# Patient Record
Sex: FEMALE | Race: WHITE
[De-identification: names, ages, dates, MRNs, and addresses within clinical notes are randomized per-mention and may not be internally consistent; named-entity substitution may affect disease eponyms.]

---

## 2017-01-11 ENCOUNTER — HOSPITAL ENCOUNTER (OUTPATIENT)
Dept: HOSPITAL 75 - REHAB | Age: 52
LOS: 22 days | Discharge: HOME | End: 2017-02-02
Attending: NURSE PRACTITIONER
Payer: COMMERCIAL

## 2017-01-11 DIAGNOSIS — M54.41: Primary | ICD-10-CM

## 2017-01-20 ENCOUNTER — HOSPITAL ENCOUNTER (OUTPATIENT)
Dept: HOSPITAL 75 - CARD | Age: 52
Discharge: HOME | End: 2017-01-20
Attending: PAIN MEDICINE
Payer: COMMERCIAL

## 2017-01-20 VITALS — DIASTOLIC BLOOD PRESSURE: 107 MMHG | SYSTOLIC BLOOD PRESSURE: 121 MMHG

## 2017-01-20 VITALS — HEIGHT: 63 IN | BODY MASS INDEX: 24.8 KG/M2 | WEIGHT: 140 LBS

## 2017-01-20 VITALS — SYSTOLIC BLOOD PRESSURE: 114 MMHG | DIASTOLIC BLOOD PRESSURE: 80 MMHG

## 2017-01-20 DIAGNOSIS — M43.16: ICD-10-CM

## 2017-01-20 DIAGNOSIS — M51.16: Primary | ICD-10-CM

## 2017-01-20 DIAGNOSIS — Z79.899: ICD-10-CM

## 2017-01-20 PROCEDURE — 62323 NJX INTERLAMINAR LMBR/SAC: CPT

## 2017-01-20 NOTE — PAIN MEDICINE-PROCEDURE
Procedure


Pre-Op/Post-Op Diagnosis


Diagnosis:  disc disorder with radiculopathy, lumbar


Indications for Operation


Low back pain


Attending Surgeon


Ashish





Procedure


Date of Service:  Jan 20, 2017


Procedure: Lumbar Epidural Steroid Injection at the L5-S1 level under 

Fluoroscopic Guidance





Procedure:


Patient was identified in the holding area. After risks, benefits, and 

alternatives were discussed with the patient, informed consent was obtained. 

Patient was brought to the fluoroscopy suite and placed prone on the procedure 

room table. A time out was performed.    Vital signs were monitored throughout 

the procedure. The patients low back was prepped and draped in the usual 

sterile fashion. The patients skin was anesthetized using 2% Lidocaine. A 

Tuohy needle was inserted and advanced to the L5-S1 epidural space under 

fluoroscopic guidance using the loss of resistance technique and intermittent 

projection of fluoroscopy. There was no paresthesia with needle placement.





The needle position was confirmed in both the AP and lateral view.





After negative aspiration 2ml of   contrast was injected under live fluoroscopy 

which showed good spread of the contrast in the epidural space at the 

appropriate level, there was no intravascular or subarachnoid spread. 





Again, after negative aspiration for heme or CSF, 2 ml of 0.25% Bupivicaine, 

2ml of preservative free normal saline, and 80mg of Kenalog was injected. The 

needle was removed and a sterile bandage was placed and the patient was 

transferred to the recovery area in stable condition. 





After a brief period of observation, patient was discharged to home with no new 

neurological deficits and no apparent complications.


Complications


None








AIDAN IRWIN MD Jan 20, 2017 2:08 pm

## 2017-03-17 ENCOUNTER — HOSPITAL ENCOUNTER (OUTPATIENT)
Dept: HOSPITAL 75 - CARD | Age: 52
Discharge: HOME | End: 2017-03-17
Attending: PAIN MEDICINE
Payer: COMMERCIAL

## 2017-03-17 VITALS — SYSTOLIC BLOOD PRESSURE: 133 MMHG | DIASTOLIC BLOOD PRESSURE: 83 MMHG

## 2017-03-17 VITALS — BODY MASS INDEX: 24.8 KG/M2 | WEIGHT: 140 LBS | HEIGHT: 63 IN

## 2017-03-17 VITALS — DIASTOLIC BLOOD PRESSURE: 78 MMHG | SYSTOLIC BLOOD PRESSURE: 133 MMHG

## 2017-03-17 DIAGNOSIS — M43.16: ICD-10-CM

## 2017-03-17 DIAGNOSIS — Z79.899: ICD-10-CM

## 2017-03-17 DIAGNOSIS — M51.16: Primary | ICD-10-CM

## 2017-03-17 PROCEDURE — 62323 NJX INTERLAMINAR LMBR/SAC: CPT

## 2017-03-17 NOTE — PAIN MEDICINE-PROCEDURE
Procedure


Pre-Op/Post-Op Diagnosis


Diagnosis:  disc disorder with radiculopathy, lumbar


Indications for Operation


Low back pain


Attending Surgeon


Ashish





Procedure


Date of Service:  Mar 17, 2017


Procedure: Lumbar Epidural Steroid Injection at the L5-S1 level under 

Fluoroscopic Guidance





Procedure:


Patient was identified in the holding area. After risks, benefits, and 

alternatives were discussed with the patient, informed consent was obtained. 

Patient was brought to the fluoroscopy suite and placed prone on the procedure 

room table. A time out was performed.    Vital signs were monitored throughout 

the procedure. The patients low back was prepped and draped in the usual 

sterile fashion. The patients skin was anesthetized using 2% Lidocaine. A 

Tuohy needle was inserted and advanced to the L5-S1 epidural space under 

fluoroscopic guidance using the loss of resistance technique and intermittent 

projection of fluoroscopy. There was no paresthesia with needle placement.





The needle position was confirmed in both the AP and lateral view.





After negative aspiration 2ml of   contrast was injected under live fluoroscopy 

which showed good spread of the contrast in the epidural space at the 

appropriate level, there was no intravascular or subarachnoid spread. 





Again, after negative aspiration for heme or CSF, 2 ml of 0.25% Bupivicaine, 

2ml of preservative free normal saline, and 80mg of Kenalog was injected. The 

needle was removed and a sterile bandage was placed and the patient was 

transferred to the recovery area in stable condition. 





After a brief period of observation, patient was discharged to home with no new 

neurological deficits and no apparent complications.


Complications


None








AIDAN IRWIN MD Mar 17, 2017 11:57 am

## 2017-03-17 NOTE — XMS REPORT
Continuity of Care Document

 Created on: 2017



Ruby Rosado

External Reference #: [id number]

: 1965

Sex: Female



Demographics







 Address  NO ADDRESS

[place]  [address]

 

 Home Phone  [phone]

 

 Preferred Language  English

 

 Marital Status  Unknown

 

 Sabianist Affiliation  Unknown

 

 Race  Unknown

 

 Ethnic Group  Unknown





Author







 Author  [facility]

 

 Organization  [facility]

 

 Address  Unknown

 

 Phone  Unavailable







Care Team Providers







 Care Team Member Name  Role  Phone

 

  PCP  Unavailable







Source Comments

Some departments are not documenting in the electronic medical record.  If you 
do not see the information that you expected, contact Release of Information in 
the Health Information Management department at [phone] for further 
assistance in locating additional records.[facility]



Active Allergies and Adverse Reactions

Not on File



Current Medications

Not on file



Active Problems

Not on file



Social History







    



  Tobacco Use   Types   Packs/Day   Years Used   Date

 

    



  Never Assessed    







Plan of Care







    



  Date   Type   Specialty   Providers   Description

 

    



  2017   Appointment   Orthopedic Surgery   SAMMY Nayak MD 



     [address] [facility] 



     [address] 



     [place] [address] 



     [phone] 



     [phone] (Fax) 









   



  Health Maintenance   Due Date   Last Done   Comments

 

   



  Physical (Comprehensive)   1974  



  Exam   

 

   



  Pertussis Vaccine   1978  

 

   



  Tetanus Vaccine   1984  

 

   



  Cervical Cancer Screening   1988  

 

   



  Breast Cancer Screening   2007  

 

   



  Influenza Vaccine   2017  







Results from Last 3 Months

Not on file

## 2017-10-13 ENCOUNTER — HOSPITAL ENCOUNTER (OUTPATIENT)
Dept: HOSPITAL 75 - RAD | Age: 52
End: 2017-10-13
Attending: NURSE PRACTITIONER
Payer: COMMERCIAL

## 2017-10-13 DIAGNOSIS — F07.81: Primary | ICD-10-CM

## 2017-10-13 PROCEDURE — 70450 CT HEAD/BRAIN W/O DYE: CPT

## 2017-10-13 NOTE — DIAGNOSTIC IMAGING REPORT
INDICATION: Previous trauma to head. Now with pain. Vomiting.

Concussive syndrome.



TECHNIQUE: Routine non contrast-enhanced axial images were

obtained from the skull base to the vertex.



COMPARISON: 8/8/2013



FINDINGS: The ventricles and cortical sulci are stable in size

and contour. There is no midline shift or mass-effect. No acute

intra-axial hemorrhage is seen. There are no abnormal areas of

increased or decreased density to suggest acute hemorrhage or

edema. No extra-axial masses or collections are present. The bony

calvarium is intact. The visualized paranasal sinuses are

unremarkable. The mastoid air cells are clear.



IMPRESSION:  

1. No acute intracranial abnormality. No CT evidence of mass,

acute infarct or intracranial hemorrhage.



Dictated by: 



  Dictated on workstation # [id number]

## 2017-10-18 ENCOUNTER — HOSPITAL ENCOUNTER (EMERGENCY)
Dept: HOSPITAL 75 - ER | Age: 52
Discharge: HOME | End: 2017-10-18
Payer: SELF-PAY

## 2017-10-18 VITALS — WEIGHT: 167 LBS | BODY MASS INDEX: 29.59 KG/M2 | HEIGHT: 63 IN

## 2017-10-18 VITALS — DIASTOLIC BLOOD PRESSURE: 84 MMHG | SYSTOLIC BLOOD PRESSURE: 135 MMHG

## 2017-10-18 DIAGNOSIS — N39.0: Primary | ICD-10-CM

## 2017-10-18 DIAGNOSIS — F17.210: ICD-10-CM

## 2017-10-18 LAB
ALBUMIN SERPL-MCNC: 4.5 GM/DL (ref 3.2–4.5)
ALT SERPL-CCNC: 13 U/L (ref 0–55)
ANION GAP SERPL CALC-SCNC: 12 MMOL/L (ref 5–14)
AST SERPL-CCNC: 19 U/L (ref 5–34)
BASOPHILS # BLD AUTO: 0 10^3/UL (ref 0–0.1)
BASOPHILS NFR BLD AUTO: 0 % (ref 0–10)
BILIRUB SERPL-MCNC: 0.5 MG/DL (ref 0.1–1)
BILIRUB UR QL STRIP: NEGATIVE
BUN SERPL-MCNC: 12 MG/DL (ref 7–18)
BUN/CREAT SERPL: 15
CALCIUM SERPL-MCNC: 10 MG/DL (ref 8.5–10.1)
CHLORIDE SERPL-SCNC: 105 MMOL/L (ref 98–107)
CO2 SERPL-SCNC: 24 MMOL/L (ref 21–32)
CREAT SERPL-MCNC: 0.79 MG/DL (ref 0.6–1.3)
EOSINOPHIL # BLD AUTO: 0 10^3/UL (ref 0–0.3)
EOSINOPHIL NFR BLD AUTO: 0 % (ref 0–10)
ERYTHROCYTE [DISTWIDTH] IN BLOOD BY AUTOMATED COUNT: 13.4 % (ref 10–14.5)
GFR SERPLBLD BASED ON 1.73 SQ M-ARVRAT: > 60 ML/MIN
GLUCOSE SERPL-MCNC: 143 MG/DL (ref 70–105)
KETONES UR QL STRIP: NEGATIVE
LEUKOCYTE ESTERASE UR QL STRIP: (no result)
LIPASE SERPL-CCNC: 22 U/L (ref 8–78)
LYMPHOCYTES # BLD AUTO: 1.1 X 10^3 (ref 1–4)
LYMPHOCYTES NFR BLD AUTO: 12 % (ref 12–44)
MAGNESIUM SERPL-MCNC: 2.5 MG/DL (ref 1.8–2.4)
MCH RBC QN AUTO: 30 PG (ref 25–34)
MCHC RBC AUTO-ENTMCNC: 33 G/DL (ref 32–36)
MCV RBC AUTO: 91 FL (ref 80–99)
MONOCYTES # BLD AUTO: 0.2 X 10^3 (ref 0–1)
MONOCYTES NFR BLD AUTO: 2 % (ref 0–12)
NEUTROPHILS # BLD AUTO: 7.8 X 10^3 (ref 1.8–7.8)
NEUTROPHILS NFR BLD AUTO: 86 % (ref 42–75)
NITRITE UR QL STRIP: NEGATIVE
PH UR STRIP: 7 [PH] (ref 5–9)
PLATELET # BLD: 357 10^3/UL (ref 130–400)
PMV BLD AUTO: 9.7 FL (ref 7.4–10.4)
POTASSIUM SERPL-SCNC: 4.3 MMOL/L (ref 3.6–5)
PROT SERPL-MCNC: 8.5 GM/DL (ref 6.4–8.2)
PROT UR QL STRIP: NEGATIVE
RBC # BLD AUTO: 4.31 10^6/UL (ref 4.35–5.85)
SODIUM SERPL-SCNC: 141 MMOL/L (ref 135–145)
SP GR UR STRIP: 1 (ref 1.02–1.02)
UROBILINOGEN UR-MCNC: NORMAL MG/DL
WBC # BLD AUTO: 9 10^3/UL (ref 4.3–11)
WBC #/AREA URNS HPF: (no result) /HPF

## 2017-10-18 PROCEDURE — 36415 COLL VENOUS BLD VENIPUNCTURE: CPT

## 2017-10-18 PROCEDURE — 87088 URINE BACTERIA CULTURE: CPT

## 2017-10-18 PROCEDURE — 83690 ASSAY OF LIPASE: CPT

## 2017-10-18 PROCEDURE — 96361 HYDRATE IV INFUSION ADD-ON: CPT

## 2017-10-18 PROCEDURE — 83735 ASSAY OF MAGNESIUM: CPT

## 2017-10-18 PROCEDURE — 96374 THER/PROPH/DIAG INJ IV PUSH: CPT

## 2017-10-18 PROCEDURE — 81000 URINALYSIS NONAUTO W/SCOPE: CPT

## 2017-10-18 PROCEDURE — 96375 TX/PRO/DX INJ NEW DRUG ADDON: CPT

## 2017-10-18 PROCEDURE — 80053 COMPREHEN METABOLIC PANEL: CPT

## 2017-10-18 PROCEDURE — 85025 COMPLETE CBC W/AUTO DIFF WBC: CPT

## 2017-10-18 NOTE — XMS REPORT
[facility]

 Created on: 10/18/2016



Ashlee  Ruby

External Reference #: [id number]

: 1965

Sex: Female



Demographics







 Address  [address] [place]  [address]

 

 Home Phone  [phone]

 

 Preferred Language  Unknown

 

 Marital Status  Unknown

 

 Yazdanism Affiliation  Unknown

 

 Race  White

 

 Ethnic Group  Not  or 





Author







 Author  NAHID FONTENOT

 

 Organization  [employer]

 

 Address  Unknown

 

 Phone  Unavailable







Care Team Providers







 Care Team Member Name  Role  Phone

 

 NAHID FONTENOT  CP  Unavailable



                                                                



Allergies, Adverse Reactions, Alerts

          





 Substance  Reaction  Event Type

 

 N.K.D.A.  Info Not Available  Non Drug Allergy



                                                                               
         



Problems

          





 Problem Type  Condition  Code  Onset Dates  Condition Status

 

 Problem  Lumbago with sciatica, right side  M54.41     Active

 

 Problem  Other chronic pain  G89.29     Active

 

 Problem  Lumbago with sciatica, left side  M54.42     Active

 

 Assessment  Other chronic pain  G89.29     Active

 

 Assessment  Lumbago with sciatica, left side  M54.42     Active

 

 Assessment  Lumbago with sciatica, right side  M54.41     Active



                                                                               
                                                           



Medications

          No Known Medications                                                 
                                       



Procedures

          





 Procedure  Coding System  Code  Date

 

 Office Visit, Est Pt., Level 3  CPT-4  62573  Oct 07, 2016

 

 TORADOL (IM) 60 MG/2ML (UP TO 15 MG)  CPT-4    Oct 07, 2016

 

 X-RAY EXAM OF LOWER SPINE  CPT-4  39570  Oct 07, 2016

 

 DEPO MEDROL 40 MG/ML  CPT-4    Oct 07, 2016

 

 THER/PROPH/DIAG INJ, SC/IM  CPT-4  01407  Oct 07, 2016



                                                                               
                                                           



Vital Signs

          





 Date/Time:  Oct 07, 2016

 

 Cardiac Monitoring Heart Rate  90 bpm

 

 Weight  145 lbs

 

 Height  64 in

 

 BMI  24.89 Index

 

 Blood Pressure Diastolic  80 mmHg

 

 Blood Pressure Systolic  122 mmHg



                                                                              



Results

          No Known Results                                                     
               



Summary Purpose

          [employer] Submission

## 2017-10-18 NOTE — XMS REPORT
[facility]

 Created on: 09/10/2017



Ruby Rosado

External Reference #: [id number]

: 1965

Sex: Female



Demographics







 Address  [address] [place]  [address]

 

 Preferred Language  Unknown

 

 Marital Status  Unknown

 

 Faith Affiliation  Unknown

 

 Race  Unknown

 

 Ethnic Group  Unknown





Author







 Author  BRIELLE SERRATO

 

 [facility]

 

 Address  [address] [place]  [address]



 

 Phone  [phone]







Care Team Providers







 Care Team Member Name  Role  Phone

 

 BRIELLE SERRATO  Unavailable  [phone]







PROBLEMS







 Type  Condition  ICD9-CM Code  ZMT70-CY Code  Onset Dates  Condition Status  
SNOMED Code

 

 Problem  Lumbago with sciatica, left side     M54.42     Active  788760713

 

 Problem  Pain in right knee     M25.561     Active  [id number]

 

 Problem  Pain in left knee     M25.562     Active  27713634

 

 Problem  Other chronic pain     G89.29     Active  77501434

 

 Problem  Lumbago with sciatica, right side     M54.41     Active  [id number]

 

 Problem  Lumbago with sciatica, unspecified side     M54.40     Active  
[id number]

 

 Problem  Back pain of lumbar region with sciatica     M54.40     Active  
[id number]







ALLERGIES

Unknown Allergies



SOCIAL HISTORY

No smoking Hx information available



PLAN OF CARE





VITAL SIGNS





MEDICATIONS

Unknown Medications



RESULTS

No Results



PROCEDURES

No Known procedures



IMMUNIZATIONS

No Known Immunizations

## 2017-10-18 NOTE — XMS REPORT
Continuity of Care Document

 Created on: 10/18/2017



MYLES REYES

External Reference #: [id number]

: 1965

Sex: Female



Demographics







 Address  [address] [place] [address]

 

 Home Phone  [phone] x

 

 Preferred Language  Unknown

 

 Marital Status  Unknown

 

 Judaism Affiliation  Unknown

 

 Race  Unknown

 

 Ethnic Group  Unknown





Author







 Author  [facility]

 

 Address  Unknown

 

 Phone  Unavailable



                                                      



Allergies

                      





 Active                    Description                    Code                  
  Type                    Severity                    Reaction                  
  Onset                    Reported/Identified                    Relationship 
to Patient                    Clinical Status                

 

 Yes                    Methotrexate                                         
Drug Allergy                                                                   
                05/10/2011                                                     
     

 

 Yes                    Methotrexate                                         
Drug Allergy                    N/A                    N/A                     
                    05/10/2011                                                 
         

 

 Yes                    No Allergy Information Available                    
[id number]                    Drug Allergy                    Unknown          
          N/A                                         2012               
                                           



                                                                               
                             



Medications

                                                                               
         



Problems

                      





 Date Dx Coded                    Attending                    Type            
        Code                    Diagnosis                    Diagnosed By      
          

 

 [id number]                    BRIELLE SERRATO                    Ot      
              M54.41                    LUMBAGO WITH SCIATICA, RIGHT SIDE      
                               

 

 2008                                                              296.90
                    MOOD DISORDER                                     

 

 2008                                                              401.1 
                   HYPERTENSION, BENIGN ESSENTIAL                              
       

 

 2008                                                              466.0 
                   Bronchitis, Acute                                     

 

 2008                                                              296.90
                    MOOD DISORDER                                     

 

 2008                                                              401.1 
                   HYPERTENSION, BENIGN ESSENTIAL                              
       

 

 2008                                                              466.0 
                   Bronchitis, Acute                                     

 

 2008                    OVIEDO DO, BESSY K                                
         296.90                    MOOD DISORDER                               
      

 

 2008                    OVIEDO DO, BESSY K                                
         401.1                    HYPERTENSION, BENIGN ESSENTIAL               
                      

 

 2008                    OVIEDO DO, BESSY K                                
         466.0                    Bronchitis, Acute                            
         

 

 2008                                                              296.90
                    MOOD DISORDER                                     

 

 2008                                                              401.1 
                   HYPERTENSION, BENIGN ESSENTIAL                              
       

 

 2008                                                              466.0 
                   Bronchitis, Acute                                     

 

 2008                    PAULETTE HART LIZBETH N                   
                      296.90                    MOOD DISORDER                  
                   

 

 2008                    CALDWELL CASHROBER APRALFA LIZBETH N                   
                      401.1                    HYPERTENSION, BENIGN ESSENTIAL  
                                   

 

 2008                    CALDWELL CASHERO APRALFA, LIZBETH N                   
                      466.0                    Bronchitis, Acute               
                      

 

 2008                    OVIEDO DO, BESSY K                                
         296.90                    MOOD DISORDER                               
      

 

 2008                    OVIEDO DO, BESSY K                                
         401.1                    HYPERTENSION, BENIGN ESSENTIAL               
                      

 

 2008                    OVIEDO DO, BESSY K                                
         466.0                    Bronchitis, Acute                            
         

 

 2008                    OVIEDO DO, BESSY K                                
         296.90                    MOOD DISORDER                               
      

 

 2008                    OVIEDO DO, BESSY K                                
         401.1                    HYPERTENSION, BENIGN ESSENTIAL               
                      

 

 2008                    OVIEDO DO, BESSY K                                
         466.0                    Bronchitis, Acute                            
         

 

 2008                    OVIEDO DO BESSY K                                
         296.90                    MOOD DISORDER                               
      

 

 2008                    OVIEDO DO BESSY K                                
         401.1                    HYPERTENSION, BENIGN ESSENTIAL               
                      

 

 2008                    OVIEDO DO BESSY K                                
         466.0                    Bronchitis, Acute                            
         

 

 2008                                                              465.9 
                   Upper Respiratory Infection                                 
    

 

 2008                                                              465.9 
                   Upper Respiratory Infection                                 
    

 

 2008                    OVIEDO DO BESSY K                                
         465.9                    Upper Respiratory Infection                  
                   

 

 2008                                                              465.9 
                   Upper Respiratory Infection                                 
    

 

 2008                    SAMARA ORTIZCY N                   
                      465.9                    Upper Respiratory Infection     
                                

 

 2008                    OVIEDO DO BESSY K                                
         465.9                    Upper Respiratory Infection                  
                   

 

 2008                    OVIEDO DO BESSY K                                
         465.9                    Upper Respiratory Infection                  
                   

 

 2008                    OVIEDO DO BESSY K                                
         465.9                    Upper Respiratory Infection                  
                   

 

 2008                                                              V25.40
                    CONTRACEPTIVE SURVEILLANCE UNSPECIFIED                     
                

 

 2008                                                              V25.40
                    CONTRACEPTIVE SURVEILLANCE UNSPECIFIED                     
                

 

 2008                    DIDI OVIEDO DOA K                                
         V25.40                    CONTRACEPTIVE SURVEILLANCE UNSPECIFIED      
                               

 

 2008                                                              V25.40
                    CONTRACEPTIVE SURVEILLANCE UNSPECIFIED                     
                

 

 2008                    LIZBETH ORTIZ N                   
                      V25.40                    CONTRACEPTIVE SURVEILLANCE 
UNSPECIFIED                                     

 

 2008                    OVIEDO DO BESSY K                                
         V25.40                    CONTRACEPTIVE SURVEILLANCE UNSPECIFIED      
                               

 

 2008                    IVELISSE MATTHEWS BESSY K                                
         V25.40                    CONTRACEPTIVE SURVEILLANCE UNSPECIFIED      
                               

 

 2008                    OVIEDO DO BESSY K                                
         V25.40                    CONTRACEPTIVE SURVEILLANCE UNSPECIFIED      
                               

 

 10/09/2008                                                              V25.49
                    SURVEILLANCE OF OTHER CONTRACEPTIVE METHOD                 
                    

 

 10/09/2008                                                              V25.49
                    SURVEILLANCE OF OTHER CONTRACEPTIVE METHOD                 
                    

 

 10/09/2008                    DIDI OVIEDO DOA K                                
         V25.49                    SURVEILLANCE OF OTHER CONTRACEPTIVE METHOD  
                                   

 

 10/09/2008                                                              V25.49
                    SURVEILLANCE OF OTHER CONTRACEPTIVE METHOD                 
                    

 

 10/09/2008                    LIZBETH ORTIZ N                   
                      V25.49                    SURVEILLANCE OF OTHER 
CONTRACEPTIVE METHOD                                     

 

 10/09/2008                    OVIEDO DO BESSY K                                
         V25.49                    SURVEILLANCE OF OTHER CONTRACEPTIVE METHOD  
                                   

 

 10/09/2008                    OVIEDO DO BESSY K                                
         V25.49                    SURVEILLANCE OF OTHER CONTRACEPTIVE METHOD  
                                   

 

 10/09/2008                    OVIEDO DO BESSY K                                
         V25.49                    SURVEILLANCE OF OTHER CONTRACEPTIVE METHOD  
                                   

 

 2009                                                              381.4 
                   Otitis Media Nonsuppurative Both Ears                       
              

 

 2009                                                              462   
                 Acute Pharyngitis                                     

 

 2009                                                              787.91
                    Diarrhea                                     

 

 2009                                                              381.4 
                   Otitis Media Nonsuppurative Both Ears                       
              

 

 2009                                                              462   
                 Acute Pharyngitis                                     

 

 2009                                                              787.91
                    Diarrhea                                     

 

 2009                    OVIEDO DO, BESSY K                                
         381.4                    Otitis Media Nonsuppurative Both Ears        
                             

 

 2009                    OVIEDO DO, BESSY K                                
         462                    Acute Pharyngitis                              
       

 

 2009                    OVIDEO DO, BESSY K                                
         787.91                    Diarrhea                                     

 

 2009                                                              381.4 
                   Otitis Media Nonsuppurative Both Ears                       
              

 

 2009                                                              462   
                 Acute Pharyngitis                                     

 

 2009                                                              787.91
                    Diarrhea                                     

 

 2009                    CALDWELL CASHERO APRN, LIZBETH N                   
                      381.4                    Otitis Media Nonsuppurative Both 
Ears                                     

 

 2009                    CALDWELL CASHERO APRN, LIZBETH N                   
                      462                    Acute Pharyngitis                 
                    

 

 2009                    CALDWELL CASHERO APRN, LIZBETH N                   
                      787.91                    Diarrhea                       
              

 

 2009                    OVIEDO DO BESSY K                                
         381.4                    Otitis Media Nonsuppurative Both Ears        
                             

 

 2009                    OVIEDO DO, BESSY K                                
         462                    Acute Pharyngitis                              
       

 

 2009                    OVIEDO DO, BESSY K                                
         787.91                    Diarrhea                                     

 

 2009                    OVIEDO DO, BESSY K                                
         381.4                    Otitis Media Nonsuppurative Both Ears        
                             

 

 2009                    OVIEDO DO, BESSY K                                
         462                    Acute Pharyngitis                              
       

 

 2009                    OVIEDO DO, BESSY K                                
         787.91                    Diarrhea                                     

 

 2009                    OVIEDO DO, BESSY K                                
         381.4                    Otitis Media Nonsuppurative Both Ears        
                             

 

 2009                    OVIEDO DO, BESSY K                                
         462                    Acute Pharyngitis                              
       

 

 2009                    OVEIDO DO, BESSY K                                
         787.91                    Diarrhea                                     

 

 10/30/2009                                                              627.9 
                   MENOPAUSAL AND POSTMENOPAUSAL DISORDER UNSPECIFIED          
                           

 

 10/30/2009                                                              719.08
                    EFFUSION OF JOINT, OTHER SPECIFIED SITES                   
                  

 

 10/30/2009                                                              627.9 
                   MENOPAUSAL AND POSTMENOPAUSAL DISORDER UNSPECIFIED          
                           

 

 10/30/2009                                                              719.08
                    EFFUSION OF JOINT, OTHER SPECIFIED SITES                   
                  

 

 10/30/2009                    OVIEDO DO BESSY K                                
         627.9                    MENOPAUSAL AND POSTMENOPAUSAL DISORDER 
UNSPECIFIED                                     

 

 10/30/2009                    OVIEDO DO BESSY K                                
         719.08                    EFFUSION OF JOINT, OTHER SPECIFIED SITES    
                                 

 

 10/30/2009                                                              627.9 
                   MENOPAUSAL AND POSTMENOPAUSAL DISORDER UNSPECIFIED          
                           

 

 10/30/2009                                                              719.08
                    EFFUSION OF JOINT, OTHER SPECIFIED SITES                   
                  

 

 10/30/2009                    PAULETTE STUBBS APRALFA LIZBETH N                   
                      627.9                    MENOPAUSAL AND POSTMENOPAUSAL 
DISORDER UNSPECIFIED                                     

 

 10/30/2009                    PAULETTE STUBBS APRALFA LIZBETH N                   
                      719.08                    EFFUSION OF JOINT, OTHER 
SPECIFIED SITES                                     

 

 10/30/2009                    OVIEDO DO, BESSY K                                
         627.9                    MENOPAUSAL AND POSTMENOPAUSAL DISORDER 
UNSPECIFIED                                     

 

 10/30/2009                    OVIEDO DO, BESSY K                                
         719.08                    EFFUSION OF JOINT, OTHER SPECIFIED SITES    
                                 

 

 10/30/2009                    OVIEDO DO, BESSY K                                
         627.9                    MENOPAUSAL AND POSTMENOPAUSAL DISORDER 
UNSPECIFIED                                     

 

 10/30/2009                    OVIEDO DO, BESSY K                                
         719.08                    EFFUSION OF JOINT, OTHER SPECIFIED SITES    
                                 

 

 10/30/2009                    OVIEDO DO, BESSY K                                
         627.9                    MENOPAUSAL AND POSTMENOPAUSAL DISORDER 
UNSPECIFIED                                     

 

 10/30/2009                    OVIEDO DO, BESSY K                                
         719.08                    EFFUSION OF JOINT, OTHER SPECIFIED SITES    
                                 

 

 2009                                                              719.46
                    PAIN IN JOINT, LOWER LEG                                   
  

 

 2009                                                              719.46
                    PAIN IN JOINT, LOWER LEG                                   
  

 

 2009                    OVIEDO DO, BESSY K                                
         719.46                    PAIN IN JOINT, LOWER LEG                    
                 

 

 2009                                                              719.46
                    PAIN IN JOINT, LOWER LEG                                   
  

 

 2009                    PAULETTE STUBBS APRALFA LIZBETH N                   
                      719.46                    PAIN IN JOINT, LOWER LEG       
                              

 

 2009                    OVIEDO DO, BESSY K                                
         719.46                    PAIN IN JOINT, LOWER LEG                    
                 

 

 2009                    OVIEDO DO, BESSY K                                
         719.46                    PAIN IN JOINT, LOWER LEG                    
                 

 

 2009                    OVIEDO DO, BESSY K                                
         719.46                    PAIN IN JOINT, LOWER LEG                    
                 

 

 2009                                                              715.96
                    OSTEOARTHRITIS KNEE                                     

 

 2009                                                              715.96
                    OSTEOARTHRITIS KNEE                                     

 

 2009                    OVIEDO DO, BESSY K                                
         715.96                    OSTEOARTHRITIS KNEE                         
            

 

 2009                                                              715.96
                    OSTEOARTHRITIS KNEE                                     

 

 2009                    PAULETTE STUBBS APRALFA LIZBETH N                   
                      715.96                    OSTEOARTHRITIS KNEE            
                         

 

 2009                    OVIEDO DO, BESSY K                                
         715.96                    OSTEOARTHRITIS KNEE                         
            

 

 2009                    OVIEDO DO, BESSY K                                
         715.96                    OSTEOARTHRITIS KNEE                         
            

 

 2009                    OVIEDO DO, BESSY K                                
         715.96                    OSTEOARTHRITIS KNEE                         
            

 

 2010                                                              388.70
                    Otalgia, Unspecified                                     

 

 2010                                                              388.70
                    Otalgia, Unspecified                                     

 

 2010                    OVIEDO DO, BESSY K                                
         388.70                    Otalgia, Unspecified                        
             

 

 2010                                                              388.70
                    Otalgia, Unspecified                                     

 

 2010                    LIZBETH ORTIZ N                   
                      388.70                    Otalgia, Unspecified           
                          

 

 2010                    OVIEDO DO, BESSY K                                
         388.70                    Otalgia, Unspecified                        
             

 

 2010                    OVIEDO DO, BESSY K                                
         388.70                    Otalgia, Unspecified                        
             

 

 2010                    OVIEDO DO, BESSY K                                
         388.70                    Otalgia, Unspecified                        
             

 

 2010                                                              381.81
                    DYSFUNCTION OF EUSTACHIAN TUBE                             
        

 

 2010                                                              719.49
                    PAIN IN JOINT, MULTIPLE SITES                              
       

 

 2010                                                              780.79
                    OTHER MALAISE AND FATIGUE                                  
   

 

 2010                                                              783.1 
                   ABNORMAL WEIGHT GAIN                                     

 

 2010                                                              E987.9
                    Falling From Unspecified Site, Undetermined Whether 
Accidentally Or Purposely Inflicted                                     

 

 2010                                                              381.81
                    DYSFUNCTION OF EUSTACHIAN TUBE                             
        

 

 2010                                                              719.49
                    PAIN IN JOINT, MULTIPLE SITES                              
       

 

 2010                                                              780.79
                    OTHER MALAISE AND FATIGUE                                  
   

 

 2010                                                              783.1 
                   ABNORMAL WEIGHT GAIN                                     

 

 2010                                                              E987.9
                    Falling From Unspecified Site, Undetermined Whether 
Accidentally Or Purposely Inflicted                                     

 

 2010                    OVIEDO DO, BESSY K                                
         381.81                    DYSFUNCTION OF EUSTACHIAN TUBE              
                       

 

 2010                    OVIEDO DO, BESSY K                                
         719.49                    PAIN IN JOINT, MULTIPLE SITES               
                      

 

 2010                    OVIEDO DO, BESSY K                                
         780.79                    OTHER MALAISE AND FATIGUE                   
                  

 

 2010                    OVIEDO DO, BESSY K                                
         783.1                    ABNORMAL WEIGHT GAIN                         
            

 

 2010                    OVIEDO DO, BESSY K                                
         E987.9                    Falling From Unspecified Site, Undetermined 
Whether Accidentally Or Purposely Inflicted                                     

 

 2010                                                              381.81
                    DYSFUNCTION OF EUSTACHIAN TUBE                             
        

 

 2010                                                              719.49
                    PAIN IN JOINT, MULTIPLE SITES                              
       

 

 2010                                                              780.79
                    OTHER MALAISE AND FATIGUE                                  
   

 

 2010                                                              783.1 
                   ABNORMAL WEIGHT GAIN                                     

 

 2010                                                              E987.9
                    Falling From Unspecified Site, Undetermined Whether 
Accidentally Or Purposely Inflicted                                     

 

 2010                    LIZBETH ORTIZ N                   
                      381.81                    DYSFUNCTION OF EUSTACHIAN TUBE 
                                    

 

 2010                    PAULETTE HART LIZBETH N                   
                      719.49                    PAIN IN JOINT, MULTIPLE SITES  
                                   

 

 2010                    SAMARA ORTIZCY N                   
                      780.79                    OTHER MALAISE AND FATIGUE      
                               

 

 2010                    LIZBETH ORTIZ N                   
                      783.1                    ABNORMAL WEIGHT GAIN            
                         

 

 2010                    LIZBETH ORTIZ N                   
                      E987.9                    Falling From Unspecified Site, 
Undetermined Whether Accidentally Or Purposely Inflicted                       
              

 

 2010                    OVIEDO DO, BESSY K                                
         381.81                    DYSFUNCTION OF EUSTACHIAN TUBE              
                       

 

 2010                    OVIEDO DO, BESSY K                                
         719.49                    PAIN IN JOINT, MULTIPLE SITES               
                      

 

 2010                    OVIEDO DO, BESSY K                                
         780.79                    OTHER MALAISE AND FATIGUE                   
                  

 

 2010                    OVIEDO DO, BESSY K                                
         783.1                    ABNORMAL WEIGHT GAIN                         
            

 

 2010                    OVIEDO DO, BESSY K                                
         E987.9                    Falling From Unspecified Site, Undetermined 
Whether Accidentally Or Purposely Inflicted                                     

 

 2010                    OVIEDO DO, BESSY K                                
         381.81                    DYSFUNCTION OF EUSTACHIAN TUBE              
                       

 

 2010                    OVIEDO DO, BESSY K                                
         719.49                    PAIN IN JOINT, MULTIPLE SITES               
                      

 

 2010                    OVIEDO DO, BSESY K                                
         780.79                    OTHER MALAISE AND FATIGUE                   
                  

 

 2010                    OVIEDO DO, BESSY K                                
         783.1                    ABNORMAL WEIGHT GAIN                         
            

 

 2010                    OVIEDO DO, BESSY K                                
         E987.9                    Falling From Unspecified Site, Undetermined 
Whether Accidentally Or Purposely Inflicted                                     

 

 2010                    OVIEDO DO, BESSY K                                
         381.81                    DYSFUNCTION OF EUSTACHIAN TUBE              
                       

 

 2010                    OVIEDO DO, BESSY K                                
         719.49                    PAIN IN JOINT, MULTIPLE SITES               
                      

 

 2010                    OVIEDO DO, BESSY K                                
         780.79                    OTHER MALAISE AND FATIGUE                   
                  

 

 2010                    OVIEDO DO, BESSY K                                
         783.1                    ABNORMAL WEIGHT GAIN                         
            

 

 2010                    OVIEDO DO, BESSY K                                
         E987.9                    Falling From Unspecified Site, Undetermined 
Whether Accidentally Or Purposely Inflicted                                     

 

 2010                                                              729.5 
                   PAIN IN LIMB                                     

 

 2010                                                              729.5 
                   PAIN IN LIMB                                     

 

 2010                    OVIEDO DO, BESSY K                                
         729.5                    PAIN IN LIMB                                 
    

 

 2010                                                              729.5 
                   PAIN IN LIMB                                     

 

 2010                    PAULETTE HART LIZBETH N                   
                      729.5                    PAIN IN LIMB                    
                 

 

 2010                    OVIEDO DO, BESSY K                                
         729.5                    PAIN IN LIMB                                 
    

 

 2010                    OVIEDO DO, BESSY K                                
         729.5                    PAIN IN LIMB                                 
    

 

 2010                    OVIEDO DO, BESSY K                                
         729.5                    PAIN IN LIMB                                 
    

 

 2010                                                              053.9 
                   HERPES ZOSTER, WITHOUT MENTION OF COMPLICATION              
                       

 

 2010                                                              692.9 
                   Contact Dermatitis And Other Eczema, Unspecified Cause      
                               

 

 2010                                                              053.9 
                   HERPES ZOSTER, WITHOUT MENTION OF COMPLICATION              
                       

 

 2010                                                              692.9 
                   Contact Dermatitis And Other Eczema, Unspecified Cause      
                               

 

 2010                    OVIEDO DO, BESSY K                                
         053.9                    HERPES ZOSTER, WITHOUT MENTION OF 
COMPLICATION                                     

 

 2010                    OVIEDO DO, BESSY K                                
         692.9                    Contact Dermatitis And Other Eczema, 
Unspecified Cause                                     

 

 2010                                                              053.9 
                   HERPES ZOSTER, WITHOUT MENTION OF COMPLICATION              
                       

 

 2010                                                              692.9 
                   Contact Dermatitis And Other Eczema, Unspecified Cause      
                               

 

 2010                    LIZBETH ORTIZ N                   
                      053.9                    HERPES ZOSTER, WITHOUT MENTION 
OF COMPLICATION                                     

 

 2010                    LIZBETH ORTIZ N                   
                      692.9                    Contact Dermatitis And Other 
Eczema, Unspecified Cause                                     

 

 2010                    OVIEDO DO, BESSY K                                
         053.9                    HERPES ZOSTER, WITHOUT MENTION OF 
COMPLICATION                                     

 

 2010                    OVIEDO DO, BESSY K                                
         692.9                    Contact Dermatitis And Other Eczema, 
Unspecified Cause                                     

 

 2010                    OVIEDO DO, BESSY K                                
         053.9                    HERPES ZOSTER, WITHOUT MENTION OF 
COMPLICATION                                     

 

 2010                    OVIEDO DO, BESSY K                                
         692.9                    Contact Dermatitis And Other Eczema, 
Unspecified Cause                                     

 

 2010                    OVIEDO DO, BESSY K                                
         053.9                    HERPES ZOSTER, WITHOUT MENTION OF 
COMPLICATION                                     

 

 2010                    OVIEDO DO, BESSY K                                
         692.9                    Contact Dermatitis And Other Eczema, 
Unspecified Cause                                     

 

 2010                                                              053.9 
                   HERPES ZOSTER, WITHOUT MENTION OF COMPLICATION              
                       

 

 2010                                                              300.00
                    ANXIETY UNSPEC                                     

 

 2010                                                              698.9 
                   Pruritus Nos                                     

 

 2010                                                              053.9 
                   HERPES ZOSTER, WITHOUT MENTION OF COMPLICATION              
                       

 

 2010                                                              300.00
                    ANXIETY UNSPEC                                     

 

 2010                                                              698.9 
                   Pruritus Nos                                     

 

 2010                    OVIEDO DO, BESSY K                                
         053.9                    HERPES ZOSTER, WITHOUT MENTION OF 
COMPLICATION                                     

 

 2010                    OVIEDO DO, BESSY K                                
         300.00                    ANXIETY UNSPEC                              
       

 

 2010                    OVIEDO DO, BESSY K                                
         698.9                    Pruritus Nos                                 
    

 

 2010                                                              053.9 
                   HERPES ZOSTER, WITHOUT MENTION OF COMPLICATION              
                       

 

 2010                                                              300.00
                    ANXIETY UNSPEC                                     

 

 2010                                                              698.9 
                   Pruritus Nos                                     

 

 2010                    CALDWELL CASHERO APRN, LIZBETH N                   
                      053.9                    HERPES ZOSTER, WITHOUT MENTION 
OF COMPLICATION                                     

 

 2010                    CALDWELL CASHERO APRN, LIZBETH N                   
                      300.00                    ANXIETY UNSPEC                 
                    

 

 2010                    CALDWELL CASHERO APRN, LIZBETH N                   
                      698.9                    Pruritus Nos                    
                 

 

 2010                    OVIEDO DO, BESSY K                                
         053.9                    HERPES ZOSTER, WITHOUT MENTION OF 
COMPLICATION                                     

 

 2010                    OVIEDO DO, BESSY K                                
         300.00                    ANXIETY UNSPEC                              
       

 

 2010                    OVIEDO DO, BESSY K                                
         698.9                    Pruritus Nos                                 
    

 

 2010                    OVIEDO DO, BESSY K                                
         053.9                    HERPES ZOSTER, WITHOUT MENTION OF 
COMPLICATION                                     

 

 2010                    OVIEDO DO, BESSY K                                
         300.00                    ANXIETY UNSPEC                              
       

 

 2010                    OVIEDO DO, BESSY K                                
         698.9                    Pruritus Nos                                 
    

 

 2010                    OVIEDO DO, BESSY K                                
         053.9                    HERPES ZOSTER, WITHOUT MENTION OF 
COMPLICATION                                     

 

 2010                    OVIEDO DO, BESSY K                                
         300.00                    ANXIETY UNSPEC                              
       

 

 2010                    OVIEDO DO, BESSY K                                
         698.9                    Pruritus Nos                                 
    

 

 06/15/2010                                                              724.5 
                   BACKACHE UNSPECIFIED                                     

 

 06/15/2010                                                              788.1 
                   Dysuria                                     

 

 06/15/2010                                                              788.63
                    Urinary Urgency                                     

 

 06/15/2010                                                              724.5 
                   BACKACHE UNSPECIFIED                                     

 

 06/15/2010                                                              788.1 
                   Dysuria                                     

 

 06/15/2010                                                              788.63
                    Urinary Urgency                                     

 

 06/15/2010                    OVIEDO DO, BESSY K                                
         724.5                    BACKACHE UNSPECIFIED                         
            

 

 06/15/2010                    OVIEDO DO, BESSY K                                
         788.1                    Dysuria                                     

 

 06/15/2010                    OVIEDO DO, BESSY K                                
         788.63                    Urinary Urgency                             
        

 

 06/15/2010                                                              724.5 
                   BACKACHE UNSPECIFIED                                     

 

 06/15/2010                                                              788.1 
                   Dysuria                                     

 

 06/15/2010                                                              788.63
                    Urinary Urgency                                     

 

 06/15/2010                    CALDWELL CASHERO APRN, LIZBETH N                   
                      724.5                    BACKACHE UNSPECIFIED            
                         

 

 06/15/2010                    CALDWELL CASHERO APRN, LIZBETH N                   
                      788.1                    Dysuria                         
            

 

 06/15/2010                    CALDWELL CASHERO APRN, LIZBETH N                   
                      788.63                    Urinary Urgency                
                     

 

 06/15/2010                    OVIEDO DO, BESSY K                                
         724.5                    BACKACHE UNSPECIFIED                         
            

 

 06/15/2010                    OVIEDO DO, BESSY K                                
         788.1                    Dysuria                                     

 

 06/15/2010                    OVIEDO DO, BESSY K                                
         788.63                    Urinary Urgency                             
        

 

 06/15/2010                    OVIEDO DO, BESSY K                                
         724.5                    BACKACHE UNSPECIFIED                         
            

 

 06/15/2010                    OVIEDO DO, BESSY K                                
         788.1                    Dysuria                                     

 

 06/15/2010                    OVIEDO DO, BESSY K                                
         788.63                    Urinary Urgency                             
        

 

 06/15/2010                    OVIEDO DO, BESSY K                                
         724.5                    BACKACHE UNSPECIFIED                         
            

 

 06/15/2010                    OVIEDO DO, BESSY K                                
         788.1                    Dysuria                                     

 

 06/15/2010                    OVIEDO DO, BESSY K                                
         788.63                    Urinary Urgency                             
        

 

 2010                                                              054.10
                    HERPES SIMPLEX GENITAL                                     

 

 2010                                                              922.1 
                   Contusion Of Trunk, Chest Wall                              
       

 

 2010                                                              V68.1 
                   Issue Of Repeat Prescriptions                               
      

 

 2010                                                              054.10
                    HERPES SIMPLEX GENITAL                                     

 

 2010                                                              922.1 
                   Contusion Of Trunk, Chest Wall                              
       

 

 2010                                                              V68.1 
                   Issue Of Repeat Prescriptions                               
      

 

 2010                    OVIEDO DO, BESSY K                                
         054.10                    HERPES SIMPLEX GENITAL                      
               

 

 2010                    OVIEDO DO, BESSY K                                
         922.1                    Contusion Of Trunk, Chest Wall               
                      

 

 2010                    OVIEDO DO, BESSY K                                
         V68.1                    Issue Of Repeat Prescriptions                
                     

 

 2010                                                              054.10
                    HERPES SIMPLEX GENITAL                                     

 

 2010                                                              922.1 
                   Contusion Of Trunk, Chest Wall                              
       

 

 2010                                                              V68.1 
                   Issue Of Repeat Prescriptions                               
      

 

 2010                    CALDWELL CASHERO APRN, LIZBETH N                   
                      054.10                    HERPES SIMPLEX GENITAL         
                            

 

 2010                    CALDWELL CASHERO APRN, LIZBETH N                   
                      922.1                    Contusion Of Trunk, Chest Wall  
                                   

 

 2010                    CALDWELL CASHERO APRN, LIZBETH N                   
                      V68.1                    Issue Of Repeat Prescriptions   
                                  

 

 2010                    OVIEDO DO, BESSY K                                
         054.10                    HERPES SIMPLEX GENITAL                      
               

 

 2010                    OVIEDO DO, BESSY K                                
         922.1                    Contusion Of Trunk, Chest Wall               
                      

 

 2010                    OVIEDO DO, BESSY K                                
         V68.1                    Issue Of Repeat Prescriptions                
                     

 

 2010                    BESSY OVIEDO DO                                
         054.10                    HERPES SIMPLEX GENITAL                      
               

 

 2010                    BESSY OVIEDO DO                                
         922.1                    Contusion Of Trunk, Chest Wall               
                      

 

 2010                    BESSY OVIEDO DO                                
         V68.1                    Issue Of Repeat Prescriptions                
                     

 

 2010                    BESSY OVIEDO DO                                
         054.10                    HERPES SIMPLEX GENITAL                      
               

 

 2010                    BESSY OVIEDO DO                                
         922.1                    Contusion Of Trunk, Chest Wall               
                      

 

 2010                    BESSY OVIEDO DO                                
         V68.1                    Issue Of Repeat Prescriptions                
                     

 

 2010                                                              300.02
                    AN GEN ANXIETY                                     

 

 2010                                                              300.02
                    AN GEN ANXIETY                                     

 

 2010                    BESSY OVIEDO DO                                
         300.02                    AN GEN ANXIETY                              
       

 

 2010                                                              300.02
                    AN GEN ANXIETY                                     

 

 2010                    LIZBETH ORTIZ N                   
                      300.02                    AN GEN ANXIETY                 
                    

 

 2010                    BESSY OVIEDO DO                                
         300.02                    AN GEN ANXIETY                              
       

 

 2010                    BESSY OVIEDO DO                                
         300.02                    AN GEN ANXIETY                              
       

 

 2010                    BESSY OVIEDO DO                                
         300.02                    AN GEN ANXIETY                              
       

 

 2010                                                              623.5 
                   Leukorrhea Not Specified As Infective                       
              

 

 2010                                                              623.5 
                   Leukorrhea Not Specified As Infective                       
              

 

 2010                    BESSY OVIEDO DO                                
         623.5                    Leukorrhea Not Specified As Infective        
                             

 

 2010                                                              623.5 
                   Leukorrhea Not Specified As Infective                       
              

 

 2010                    LIZBETH ORTIZ N                   
                      623.5                    Leukorrhea Not Specified As 
Infective                                     

 

 2010                    BESSY OVIEDO DO                                
         623.5                    Leukorrhea Not Specified As Infective        
                             

 

 2010                    BESSY OVIEDO DO                                
         623.5                    Leukorrhea Not Specified As Infective        
                             

 

 2010                    BESSY OVIEDO DO                                
         623.5                    Leukorrhea Not Specified As Infective        
                             

 

 2011                                                              112.1 
                   Candidiasis Of Vulva And Vagina                             
        

 

 2011                                                              305.1 
                   NONDEPENDENT TOBACCO USE DISORDER                           
          

 

 2011                                                              727.51
                    SYNOVIAL CYST OF POPLITEAL SPACE                           
          

 

 2011                                                              112.1 
                   Candidiasis Of Vulva And Vagina                             
        

 

 2011                                                              305.1 
                   NONDEPENDENT TOBACCO USE DISORDER                           
          

 

 2011                                                              727.51
                    SYNOVIAL CYST OF POPLITEAL SPACE                           
          

 

 2011                    BESSY OVIEDO DO                                
         112.1                    Candidiasis Of Vulva And Vagina              
                       

 

 2011                    OVIEDO DO, BESSY K                                
         305.1                    NONDEPENDENT TOBACCO USE DISORDER            
                         

 

 2011                    IVELISSE MATTHEWS BESSY K                                
         727.51                    SYNOVIAL CYST OF POPLITEAL SPACE            
                         

 

 2011                                                              112.1 
                   Candidiasis Of Vulva And Vagina                             
        

 

 2011                                                              305.1 
                   NONDEPENDENT TOBACCO USE DISORDER                           
          

 

 2011                                                              727.51
                    SYNOVIAL CYST OF POPLITEAL SPACE                           
          

 

 2011                    LIZBETH ORTIZ N                   
                      112.1                    Candidiasis Of Vulva And Vagina 
                                    

 

 2011                    SAMARA ORTIZCY N                   
                      305.1                    NONDEPENDENT TOBACCO USE 
DISORDER                                     

 

 2011                    SAMARA ORTIZCY N                   
                      727.51                    SYNOVIAL CYST OF POPLITEAL 
SPACE                                     

 

 2011                    OVIEDO DO BESSY K                                
         112.1                    Candidiasis Of Vulva And Vagina              
                       

 

 2011                    IVELISSE MATTHEWS BESSY K                                
         305.1                    NONDEPENDENT TOBACCO USE DISORDER            
                         

 

 2011                    IVELISSE MATTHEWS BESSY K                                
         727.51                    SYNOVIAL CYST OF POPLITEAL SPACE            
                         

 

 2011                    DIDI OVIEDO DOA K                                
         112.1                    Candidiasis Of Vulva And Vagina              
                       

 

 2011                    IVELISSE MATTHEWS BESSY K                                
         305.1                    NONDEPENDENT TOBACCO USE DISORDER            
                         

 

 2011                    DIDI OVIEDO DOA K                                
         727.51                    SYNOVIAL CYST OF POPLITEAL SPACE            
                         

 

 2011                    DIDI OVIEDO DOA K                                
         112.1                    Candidiasis Of Vulva And Vagina              
                       

 

 2011                    IVELISSE MATTHEWS BESSY K                                
         305.1                    NONDEPENDENT TOBACCO USE DISORDER            
                         

 

 2011                    DIDI OVIEDO DOA K                                
         727.51                    SYNOVIAL CYST OF POPLITEAL SPACE            
                         

 

 2011                                                              719.47
                    Pain In Joint Involving Ankle And Foot                     
                

 

 2011                                                              719.47
                    Pain In Joint Involving Ankle And Foot                     
                

 

 2011                    DIDI OVIEDO DOA GUY                                
         719.47                    Pain In Joint Involving Ankle And Foot      
                               

 

 2011                                                              719.47
                    Pain In Joint Involving Ankle And Foot                     
                

 

 2011                    LIZBETH ORTIZ N                   
                      719.47                    Pain In Joint Involving Ankle 
And Foot                                     

 

 2011                    BESSY OVIEDO DO                                
         719.47                    Pain In Joint Involving Ankle And Foot      
                               

 

 2011                    DIDI OVIEDO DOA K                                
         719.47                    Pain In Joint Involving Ankle And Foot      
                               

 

 2011                    DIDI OVIEDO DOA GUY                                
         719.47                    Pain In Joint Involving Ankle And Foot      
                               

 

 2011                                                              110.1 
                   ONYCHOMYCOSIS                                     

 

 2011                                                              726.79
                    BURSITIS FOOT                                     

 

 2011                                                              110.1 
                   ONYCHOMYCOSIS                                     

 

 2011                                                              726.79
                    BURSITIS FOOT                                     

 

 2011                    OVIEDO DO, BESSY K                                
         110.1                    ONYCHOMYCOSIS                                
     

 

 2011                    OVIEDO DO, BESSY K                                
         726.79                    BURSITIS FOOT                               
      

 

 2011                                                              110.1 
                   ONYCHOMYCOSIS                                     

 

 2011                                                              726.79
                    BURSITIS FOOT                                     

 

 2011                    LIZBETH ORTIZ N                   
                      110.1                    ONYCHOMYCOSIS                   
                  

 

 2011                    PAULETTE HART, LIZBETH N                   
                      726.79                    BURSITIS FOOT                  
                   

 

 2011                    OVIEDO DO, BESSY K                                
         110.1                    ONYCHOMYCOSIS                                
     

 

 2011                    OVIEDO DO, BESSY K                                
         726.79                    BURSITIS FOOT                               
      

 

 2011                    OVIEDO DO, BESSY K                                
         110.1                    ONYCHOMYCOSIS                                
     

 

 2011                    OVIEDO DO, BESSY K                                
         726.79                    BURSITIS FOOT                               
      

 

 2011                    OVIEDO DO, BESSY K                                
         110.1                    ONYCHOMYCOSIS                                
     

 

 2011                    OVIEDO DO, BESSY K                                
         726.79                    BURSITIS FOOT                               
      

 

 2011                                                              V58.69
                    MEDICATION HIGH RISK                                     

 

 2011                                                              V58.69
                    MEDICATION HIGH RISK                                     

 

 2011                    OVIEDO DO, BESSY K                                
         V58.69                    MEDICATION HIGH RISK                        
             

 

 2011                                                              V58.69
                    MEDICATION HIGH RISK                                     

 

 2011                    PAULETTE HART, LIZBETH N                   
                      V58.69                    MEDICATION HIGH RISK           
                          

 

 2011                    OVIEDO DO, BESSY K                                
         V58.69                    MEDICATION HIGH RISK                        
             

 

 2011                    OVIEDO DO, BESSY K                                
         V58.69                    MEDICATION HIGH RISK                        
             

 

 2011                    OVIEDO DO, BESSY K                                
         V58.69                    MEDICATION HIGH RISK                        
             

 

 2012                                                              309.0 
                   ADJUSTMENT DISORDER WITH DEPRESSED MOOD                     
                

 

 2012                                                              309.0 
                   ADJUSTMENT DISORDER WITH DEPRESSED MOOD                     
                

 

 2012                    DIDI OVIEDO DOA K                                
         309.0                    ADJUSTMENT DISORDER WITH DEPRESSED MOOD      
                               

 

 2012                                                              309.0 
                   ADJUSTMENT DISORDER WITH DEPRESSED MOOD                     
                

 

 2012                    LIZBETH ORTIZ N                   
                      309.0                    ADJUSTMENT DISORDER WITH 
DEPRESSED MOOD                                     

 

 2012                    OVIEDO DO BESSY K                                
         309.0                    ADJUSTMENT DISORDER WITH DEPRESSED MOOD      
                               

 

 2012                    OVIEDO DO, BESSY K                                
         309.0                    ADJUSTMENT DISORDER WITH DEPRESSED MOOD      
                               

 

 2012                    OVIEDO BESSY MATTHEWS K                                
         309.0                    ADJUSTMENT DISORDER WITH DEPRESSED MOOD      
                               

 

 2012                                                              627.8 
                   OTHER SPECIFIED MENOPAUSAL AND POSTMENOPAUSAL DISORDERS     
                                

 

 2012                                                              627.8 
                   OTHER SPECIFIED MENOPAUSAL AND POSTMENOPAUSAL DISORDERS     
                                

 

 2012                    BESSY OVIEDO DO K                                
         627.8                    OTHER SPECIFIED MENOPAUSAL AND POSTMENOPAUSAL 
DISORDERS                                     

 

 2012                                                              627.8 
                   OTHER SPECIFIED MENOPAUSAL AND POSTMENOPAUSAL DISORDERS     
                                

 

 2012                    LIZBETH ORTIZ N                   
                      627.8                    OTHER SPECIFIED MENOPAUSAL AND 
POSTMENOPAUSAL DISORDERS                                     

 

 2012                    BESSY OVIEDO DO K                                
         627.8                    OTHER SPECIFIED MENOPAUSAL AND POSTMENOPAUSAL 
DISORDERS                                     

 

 2012                    DIDI OVIEDO DOA K                                
         627.8                    OTHER SPECIFIED MENOPAUSAL AND POSTMENOPAUSAL 
DISORDERS                                     

 

 2012                    BESSY OVIEDO DO K                                
         627.8                    OTHER SPECIFIED MENOPAUSAL AND POSTMENOPAUSAL 
DISORDERS                                     

 

 2012                                                              780.52
                    INSOMNIA UNSPECIFIED                                     

 

 2012                                                              780.52
                    INSOMNIA UNSPECIFIED                                     

 

 2012                    BESSY OVIEDO DO K                                
         780.52                    INSOMNIA UNSPECIFIED                        
             

 

 2012                                                              780.52
                    INSOMNIA UNSPECIFIED                                     

 

 2012                    LIZBETH ORTIZ N                   
                      780.52                    INSOMNIA UNSPECIFIED           
                          

 

 2012                    BESSY OVIEDO DO K                                
         780.52                    INSOMNIA UNSPECIFIED                        
             

 

 2012                    BESSY OVIEDO DO K                                
         780.52                    INSOMNIA UNSPECIFIED                        
             

 

 2012                    BESSY OVIEDO DO K                                
         780.52                    INSOMNIA UNSPECIFIED                        
             

 

 2012                                         Ot                    297.9
                    [place] NOS                                     

 

 2012                                         Ot                    305.1
                    TOBACCO USE DISORDER                                     

 

 2012                                         Ot                    
780.09                    OTHER ALTERATION OF CONSCIOUSNESS                    
                 

 

 2012                                         Ot                    969.4
                    POIS-BENZODIAZEPINE STAPLETON                                   
  

 

 2012                                         Ot                    
E980.3                    UNDETERM POIS-PSYCHOTROP                             
        

 

 10/17/2012                                         Ot                    298.9
                    PSYCHOSIS NOS                                     

 

 2013                                                              723.1 
                   CERVICALGIA                                     

 

 2013                                                              V76.19
                    OTHER SCREENING BREAST EXAMINATION                         
            

 

 2013                    LIZBETH ORTIZ N                   
                      723.1                    CERVICALGIA                     
                

 

 2013                    LIZBETH ORTIZ N                   
                      V76.19                    OTHER SCREENING BREAST 
EXAMINATION                                     

 

 2013                    BESSY OVIEDO DO                                
         723.1                    CERVICALGIA                                  
   

 

 2013                    BESSY OVIEDO DO                                
         V76.19                    OTHER SCREENING BREAST EXAMINATION          
                           

 

 2013                    OVIEDO DO, BESSY K                                
         723.1                    CERVICALGIA                                  
   

 

 2013                    OVIEDO DO, BESSY K                                
         V76.19                    OTHER SCREENING BREAST EXAMINATION          
                           

 

 2013                    OVIEDO DO, BESSY K                                
         723.1                    CERVICALGIA                                  
   

 

 2013                    OVIEDO DO, BESSY K                                
         V76.19                    OTHER SCREENING BREAST EXAMINATION          
                           

 

 2013                    LIZBETH ORTIZ N                   
                      723.8                    OTHER SYNDROMES AFFECTING 
CERVICAL REGION                                     

 

 2013                    LIZBETH ORTIZ N                   
                      728.85                    SPASM OF MUSCLE                
                     

 

 2013                    OVIEDO DO, BESSY K                                
         723.8                    OTHER SYNDROMES AFFECTING CERVICAL REGION    
                                 

 

 2013                    OVIEDO DO, BESSY K                                
         728.85                    SPASM OF MUSCLE                             
        

 

 2013                    OVIEDO DO, BESSY K                                
         723.8                    OTHER SYNDROMES AFFECTING CERVICAL REGION    
                                 

 

 2013                    OVIEDO DO, BESSY K                                
         728.85                    SPASM OF MUSCLE                             
        

 

 2013                    OVIEDO DO, BESSY K                                
         723.8                    OTHER SYNDROMES AFFECTING CERVICAL REGION    
                                 

 

 2013                    OVIEDO DO BESSY K                                
         728.85                    SPASM OF MUSCLE                             
        

 

 10/10/2013                    OVIEDO DO BESSY K                                
         733.92                    CHONDROMALACIA                              
       

 

 10/10/2013                    OVIEDO DO, BESSY K                                
         733.92                    CHONDROMALACIA                              
       

 

 10/10/2013                    OVIEDO DO, BESSY K                                
         733.92                    CHONDROMALACIA                              
       

 

 2014                                         Ot                    
V76.12                                                          

 

 2014                                         Ot                    
719.06                                                          

 

 2014                                         Ot                    
727.51                                                          

 

 2014                                         Ot                    
V76.12                                                          

 

 2014                    DL LANDRY ARNP                    Ot   
                 793.82                                                        
  

 

 2014                    DL LANDRY                    Ot   
                 V76.12                                                        
  

 

 2014                    DL LANDRY ARNP                    Ot   
                 793.80                                                        
  

 

 2014                    LIZBETH POTTS ARNP                    
Ot                    728.85                                                   
       

 

 2014                    DL LANDRY ARNP                    Ot   
                 793.80                                                        
  

 

 2014                                         Ot                    
V76.12                                                          

 

 2014                                         Ot                    
719.06                                                          

 

 2014                                         Ot                    
727.51                                                          

 

 2014                                         Ot                    
V76.12                                                          

 

 2014                    DL LANDRY ARNP                    Ot   
                 793.82                                                        
  

 

 2014                    DL LANDRY                    Ot   
                 V76.12                                                        
  

 

 2014                    DL LANDRY ARNP                    Ot   
                 793.80                                                        
  

 

 2014                    LIZBETH POTTS ARNP                    
Ot                    728.85                                                   
       

 

 2014                    DL LANDRY ARNP                    Ot   
                 793.80                                                        
  

 

 2014                                         Ot                    
V76.12                                                          

 

 2014                                         Ot                    
719.06                                                          

 

 2014                                         Ot                    
727.51                                                          

 

 2014                                         Ot                    
V76.12                                                          

 

 2014                    DL LANDRY ARNP                    Ot   
                 793.82                                                        
  

 

 2014                    DL LANDRYP                    Ot   
                 V76.12                                                        
  

 

 2014                    DL LANDRYP                    Ot   
                 793.80                                                        
  

 

 2014                    LIZBETH POTTS ARNP                    
Ot                    728.85                                                   
       

 

 2014                    DL LANDRYP                    Ot   
                 793.80                                                        
  

 

 2014                    DL LANDRYP                    Ot   
                 793.80                                                        
  

 

 2016                                         Ot                    
V76.12                    OTH SCREEN MAMMO-MALIGN NEOPLASM OF MARITZA             
                        

 

 2016                    DL LANDRY ARNP                    Ot   
                 793.82                    INCONCLUSIVE MAMMOGRAM              
                       

 

 2016                    DL LANDRY ARNP                    Ot   
                 V76.12                    OTH SCREEN MAMMO-MALIGN NEOPLASM OF 
[place]                                     

 

 2016                    DL LANDRY ARNP                    Ot   
                 793.80                    UNSPEC ABNORMAL MAMMOGRAM           
                          

 

 2016                    LIZBETH POTTS ARNP                    
Ot                    728.85                    SPASM OF MUSCLE                
                     

 

 2016                    DL LANDRY ARNP                    Ot   
                 793.80                    UNSPEC ABNORMAL MAMMOGRAM           
                          

 

 2016                    DL LANDRY ARNP                    Ot   
                 793.80                    UNSPEC ABNORMAL MAMMOGRAM           
                          

 

 2016                                         Ot                    
V76.12                    OTH SCREEN MAMMO-MALIGN NEOPLASM OF [place]             
                        

 

 2016                    DL LANDRY ARNP                    Ot   
                 793.82                    INCONCLUSIVE MAMMOGRAM              
                       

 

 2016                    DL LANDRY ARNP                    Ot   
                 V76.12                    OTH SCREEN MAMMO-MALIGN NEOPLASM OF 
[place]                                     

 

 2016                    DL LANDRY ARNP                    Ot   
                 793.80                    UNSPEC ABNORMAL MAMMOGRAM           
                          

 

 2016                    LIZBETH POTTS ARNP                    
Ot                    728.85                    SPASM OF MUSCLE                
                     

 

 2016                    DL LANDRY ARNP                    Ot   
                 793.80                    UNSPEC ABNORMAL MAMMOGRAM           
                          

 

 2016                    DL LANDRY ARNP                    Ot   
                 793.80                    UNSPEC ABNORMAL MAMMOGRAM           
                          

 

 12/15/2016                                         Ot                    
V76.12                    OTH SCREEN MAMMO-MALIGN NEOPLASM OF [place]             
                        

 

 12/15/2016                    DL LANDRY ARNP                    Ot   
                 793.82                    INCONCLUSIVE MAMMOGRAM              
                       

 

 12/15/2016                    DL LANDRY ARNP                    Ot   
                 V76.12                    OTH SCREEN MAMMO-MALIGN NEOPLASM OF 
[place]                                     

 

 12/15/2016                    DL LANDRY ARNP                    Ot   
                 793.80                    UNSPEC ABNORMAL MAMMOGRAM           
                          

 

 12/15/2016                    LIZBETH POTTS N ARNP                    
Ot                    728.85                    SPASM OF MUSCLE                
                     

 

 12/15/2016                    DL LANDRY ARNP                    Ot   
                 793.80                    UNSPEC ABNORMAL MAMMOGRAM           
                          

 

 12/15/2016                    DL LANDRY ARNP                    Ot   
                 793.80                    UNSPEC ABNORMAL MAMMOGRAM           
                          

 

 12/15/2016                    BRIELLE SERRATO ARNP                    Ot      
              M48.06                    SPINAL STENOSIS, LUMBAR REGION         
                            

 

 12/15/2016                    BRIELLE SERRATO ARNP                    Ot      
              Z12.31                    ENCNTR SCREEN MAMMOGRAM FOR MALIGNANT 
NE                                     

 

 2016                    EMMIE SERRATOA ARNP                    Ot      
              M48.06                    SPINAL STENOSIS, LUMBAR REGION         
                            

 

 2016                                         Ot                    
V76.12                    OTH SCREEN MAMMO-MALIGN NEOPLASM OF [place]             
                        

 

 2016                    DL LANDRY ARNP                    Ot   
                 793.82                    INCONCLUSIVE MAMMOGRAM              
                       

 

 2016                    DL LANDRY ARNP                    Ot   
                 V76.12                    OTH SCREEN MAMMO-MALIGN NEOPLASM OF 
[place]                                     

 

 2016                    DL LANDRY ARNP                    Ot   
                 793.80                    UNSPEC ABNORMAL MAMMOGRAM           
                          

 

 2016                    LIZBETH POTTS ARNP                    
Ot                    728.85                    SPASM OF MUSCLE                
                     

 

 2016                    DL LANDRY ARNP                    Ot   
                 793.80                    UNSPEC ABNORMAL MAMMOGRAM           
                          

 

 2016                    DL LANDRY ARNP                    Ot   
                 793.80                    UNSPEC ABNORMAL MAMMOGRAM           
                          

 

 2016                    BRIELLE SERRATO ARNP                    Ot      
              M48.06                    SPINAL STENOSIS, LUMBAR REGION         
                            

 

 2016                    BRIELLE SERRATO ARNP                    Ot      
              Z12.31                    ENCNTR SCREEN MAMMOGRAM FOR MALIGNANT 
NE                                     

 

 2016                    BRIELLE SERRATO ARN                    Ot      
              Z12.31                    ENCNTR SCREEN MAMMOGRAM FOR MALIGNANT 
NE                                     

 

 2017                                         Ot                    
V76.12                    OTH SCREEN MAMMO-MALIGN NEOPLASM OF [place]             
                        

 

 2017                    DL LANDRY ARNP                    Ot   
                 793.82                    INCONCLUSIVE MAMMOGRAM              
                       

 

 2017                    DL LANDRY ARNP                    Ot   
                 V76.12                    OTH SCREEN MAMMO-MALIGN NEOPLASM OF 
[place]                                     

 

 2017                    DL LANDRY ARNP                    Ot   
                 793.80                    UNSPEC ABNORMAL MAMMOGRAM           
                          

 

 2017                    LIZBETH POTTS ARNP                    
Ot                    728.85                    SPASM OF MUSCLE                
                     

 

 2017                    DL LANDRY ARNP                    Ot   
                 793.80                    UNSPEC ABNORMAL MAMMOGRAM           
                          

 

 2017                    DL LANDRY ARNP                    Ot   
                 793.80                    UNSPEC ABNORMAL MAMMOGRAM           
                          

 

 2017                    BRIELLE SERRATOP                    Ot      
              M54.41                    LUMBAGO WITH SCIATICA, RIGHT SIDE      
                               

 

 2017                    BRIELLE SERRATO ARNP                    Ot      
              M48.06                    SPINAL STENOSIS, LUMBAR REGION         
                            

 

 2017                    BRIELLE SERRATOP                    Ot      
              Z12.31                    ENCNTR SCREEN MAMMOGRAM FOR MALIGNANT 
NE                                     

 

 2017                    ALIDA CABRAL, AIDAN MCKEON                    Ot        
            M43.16                    SPONDYLOLISTHESIS, LUMBAR REGION         
                            

 

 2017                    AIDAN IRWIN MD                    Ot        
            M51.16                    INTERVERTEBRAL DISC DISORDERS W RADICULO 
                                    

 

 2017                    ALIDA CABRAL, IADAN MCKEON                    Ot        
            Z79.899                    OTHER LONG TERM (CURRENT) DRUG THERAPY  
                                   

 

 2017                                         Ot                    
V76.12                    OTH SCREEN MAMMO-MALIGN NEOPLASM OF [place]             
                        

 

 2017                    DL LANDRY ARNP                    Ot   
                 793.82                    INCONCLUSIVE MAMMOGRAM              
                       

 

 2017                    DL LANDRY ARNP                    Ot   
                 V76.12                    OTH SCREEN MAMMO-MALIGN NEOPLASM OF 
[place]                                     

 

 2017                    DL LANDRY ARNP                    Ot   
                 793.80                    UNSPEC ABNORMAL MAMMOGRAM           
                          

 

 2017                    LIZBETH POTTS ARNP                    
Ot                    728.85                    SPASM OF MUSCLE                
                     

 

 2017                    DL LANDRY ARNP                    Ot   
                 793.80                    UNSPEC ABNORMAL MAMMOGRAM           
                          

 

 2017                    DL LANDRY ARNP                    Ot   
                 793.80                    UNSPEC ABNORMAL MAMMOGRAM           
                          

 

 2017                    ROJELIO BRIELLE ARNP                    Ot      
              M54.41                    LUMBAGO WITH SCIATICA, RIGHT SIDE      
                               

 

 2017                    ROJELIO BRIELLE ARNP                    Ot      
              M48.06                    SPINAL STENOSIS, LUMBAR REGION         
                            

 

 2017                    ROJELIO BRIELLE ARNP                    Ot      
              Z12.31                    ENCNTR SCREEN MAMMOGRAM FOR MALIGNANT 
NE                                     

 

 2017                    ROJELIO BRIELLE MEDEROSP                    Ot      
              M54.41                    LUMBAGO WITH SCIATICA, RIGHT SIDE      
                               

 

 2017                    AIDAN IRWIN MD                    Ot        
            M43.16                    SPONDYLOLISTHESIS, LUMBAR REGION         
                            

 

 2017                    AIDAN IRWIN MD                    Ot        
            M51.16                    INTERVERTEBRAL DISC DISORDERS W RADICULO 
                                    

 

 2017                    AIDAN IRWIN MD                    Ot        
            Z79.899                    OTHER LONG TERM (CURRENT) DRUG THERAPY  
                                   

 

 2017                    AIDAN IRWIN MD                    Ot        
            M43.16                    SPONDYLOLISTHESIS, LUMBAR REGION         
                            

 

 2017                    AIDAN IRWIN MD                    Ot        
            M51.16                    INTERVERTEBRAL DISC DISORDERS W RADICULO 
                                    

 

 2017                    AIDAN IRWIN MD                    Ot        
            Z79.899                    OTHER LONG TERM (CURRENT) DRUG THERAPY  
                                   

 

 2017                                         Ot                    
V76.12                    OTH SCREEN MAMMO-MALIGN NEOPLASM OF [place]             
                        

 

 2017                    DL LANDRY ARNP                    Ot   
                 793.82                    INCONCLUSIVE MAMMOGRAM              
                       

 

 2017                    DL LANDRYP                    Ot   
                 V76.12                    OTH SCREEN MAMMO-MALIGN NEOPLASM OF 
[place]                                     

 

 2017                    DL LANDRY ARNP                    Ot   
                 793.80                    UNSPEC ABNORMAL MAMMOGRAM           
                          

 

 2017                    LIZBETH POTTS ARNP                    
Ot                    728.85                    SPASM OF MUSCLE                
                     

 

 2017                    DL LANDRY ARNP                    Ot   
                 793.80                    UNSPEC ABNORMAL MAMMOGRAM           
                          

 

 2017                    DL LANDRY ARNP                    Ot   
                 793.80                    UNSPEC ABNORMAL MAMMOGRAM           
                          

 

 2017                    BRIELLE SERRATO ARNP                    Ot      
              M48.06                    SPINAL STENOSIS, LUMBAR REGION         
                            

 

 2017                    BRIELLE SERRATOP                    Ot      
              Z12.31                    ENCNTR SCREEN MAMMOGRAM FOR MALIGNANT 
NE                                     

 

 2017                                         Ot                    
V76.12                    OTH SCREEN MAMMO-MALIGN NEOPLASM OF [place]             
                        

 

 2017                    DL LANDRYP                    Ot   
                 793.82                    INCONCLUSIVE MAMMOGRAM              
                       

 

 2017                    DL LANDRY                    Ot   
                 V76.12                    OTH SCREEN MAMMO-MALIGN NEOPLASM OF 
[place]                                     

 

 2017                    DL LANDRY ARNP                    Ot   
                 793.80                    UNSPEC ABNORMAL MAMMOGRAM           
                          

 

 2017                    LIZBETH POTTS ARNP                    
Ot                    728.85                    SPASM OF MUSCLE                
                     

 

 2017                    DL LANDRY ARNP                    Ot   
                 793.80                    UNSPEC ABNORMAL MAMMOGRAM           
                          

 

 2017                    DL LANDRY ARNP                    Ot   
                 793.80                    UNSPEC ABNORMAL MAMMOGRAM           
                          

 

 2017                    BRIELLE SERRATOP                    Ot      
              M48.06                    SPINAL STENOSIS, LUMBAR REGION         
                            

 

 2017                    BRIELLE SERRATOP                    Ot      
              Z12.31                    ENCNTR SCREEN MAMMOGRAM FOR MALIGNANT 
NE                                     

 

 2017                    AIDAN IRWIN MD                    Ot        
            M43.16                    SPONDYLOLISTHESIS, LUMBAR REGION         
                            

 

 2017                    AIDAN IRWIN MD                    Ot        
            M51.16                    INTERVERTEBRAL DISC DISORDERS W RADICULO 
                                    

 

 2017                    AIDAN IRWIN MD                    Ot        
            Z79.899                    OTHER LONG TERM (CURRENT) DRUG THERAPY  
                                   

 

 2017                                         Ot                    
V76.12                                                          

 

 2017                                         Ot                    
611.72                                                          

 

 2017                                         Ot                    
611.72                                                          

 

 2017                                         Ot                    
780.79                                                          

 

 2017                                         Ot                    V16.3
                                                          

 

 2017                                         Ot                    
V72.83                                                          

 

 2017                                         Ot                    V74.8
                                                          

 

 2017                                         Ot                    
V76.12                                                          

 

 2017                                         Ot                    
V76.12                    OTH SCREEN MAMMO-MALIGN NEOPLASM OF [place]             
                        

 

 2017                                         Ot                    
719.06                    JOINT EFFUSION-L/LEG                                 
    

 

 2017                                         Ot                    
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                              
       

 

 2017                                         Ot                    
V76.12                    OTH SCREEN MAMMO-MALIGN NEOPLASM OF [place]             
                        

 

 2017                    DL LANDRY ARNP                    Ot   
                 793.82                    INCONCLUSIVE MAMMOGRAM              
                       

 

 2017                    DL LANDRY                    Ot   
                 V76.12                    OTH SCREEN MAMMO-MALIGN NEOPLASM OF 
[place]                                     

 

 2017                    DL LANDRYP                    Ot   
                 793.80                    UNSPEC ABNORMAL MAMMOGRAM           
                          

 

 2017                    LIZBETH POTTS ARNP                    
Ot                    728.85                    SPASM OF MUSCLE                
                     

 

 2017                    DL LANDRY ARNP                    Ot   
                 793.80                    UNSPEC ABNORMAL MAMMOGRAM           
                          

 

 2017                    DL LANDRY ARNP                    Ot   
                 793.80                    UNSPEC ABNORMAL MAMMOGRAM           
                          

 

 2017                    BRIELLE SERRATO ARNP                    Ot      
              M48.06                    SPINAL STENOSIS, LUMBAR REGION         
                            

 

 2017                    BRIELLE SERRATO                    Ot      
              Z12.31                    ENCNTR SCREEN MAMMOGRAM FOR MALIGNANT 
NE                                     



                                                                               
                                                                               
                                                                               
                                                                               
                                                                               
                                                                               
                                                                               
                                                                               
                                                                               
                                                                               
                                                                               
                                                                               
                                                                               
                                                                               
                                                                               
                                                                               
                                                                               
                                                                               
                                                                               
                                                                               
                                                                               
                                                                               
                                                                               
                                                                               
                                                                               
                                                                               
                                                                               
                                                                               
                                                                               
                                                                               
                                                                               
                                                                               
                                                                               
                                                                               
                                                                               
                                                                               
                                                                               
                                                                               
                                                                               
                                                                               
                                                                               
                                                                               
                                                                               
                                                                               
                                                                               
                                                                               
                                                                               
                                                                               
                                                                               
                                                                               
                                                                               
                                                                               
                                                                               
                                                                               
                                                                               
                                                                               
                                                                               
                                                                               
                                                                               
                                                                               
                                                                               
                                                                               
                                                                               
                                                                               
                                                                        



Procedures

                      





 Code                    Description                    Performed By           
         Performed On                

 

                     EDWARD BOWEN                                                                           

 

                     [id number]                                                     
     ROUTINE VENIPUNCTURE                                                      
     2013                

 

                     [id number]                                                     
     URIC ACID                                                           2013                

 

                     [id number]                                                     
     MAMMOGRAM, SCREENING                                                      
     2013                

 

                     [id number]                                                     
     MAMMOGRAM DX, LEFT                                                        
   2013                

 

                     88706                                                     
     US BREAST ULTRASOUND, LEFT                                                
           2013                

 

                     36476                                                     
     CULTURE STOOL                                                           10/
2013                

 

                     18395                                                     
     STOOL FOR O & P                                                           
10/08/2013                

 

                     64876                                                     
     CLOSTRIDIUM (C-DIFF)                                                      
     10/09/2013                

 

                     42497                                                     
     JOINT INJECTION- LARGE JOINT (SPECIFY MEDCIN DESCRIPTION)                 
                                          10/10/2013                

 

                     32632                                                     
     JOINT INJECTION- LARGE JOINT (SPECIFY MEDCIN DESCRIPTION)                 
                                          2014                

 

                     38913                                                     
     XRAY KNEE RIGHT 1 OR 2 VIEWS                                              
             2014                                                     
     JOINT INJECTION- LARGE JOINT (SPECIFY MEDCIN DESCRIPTION)                 
                                          2014                



                                                                               
                                                                               
                                                  



Results

                                                                    



Encounters

                      





 ACCT No.                    Visit Date/Time                    Discharge      
              Status                    Pt. Type                    Provider   
                 Facility                    Loc./Unit                    
Complaint                

 

 [id number]                    2014 14:50:00                    2014 23:
59:59                    CLS                    Outpatient                    
BESSY OVIEDO DO                                                               
                

 

 [id number]                    2014 12:26:00                    2014 23:
59:59                    CLS                    Outpatient                    
BESSY OVIEDO DO                                                               
                

 

 [id number]                    10/10/2013 12:31:00                    10/10/2013 23:
59:59                    CLS                    Outpatient                    
BESSY OVIEDO DO                                                               
                

 

 [id number]                    10/08/2013 10:59:00                    10/08/2013 23:
59:59                    CLS                    Outpatient                    
LIZBETH ORTIZ                                                  
                             

 

 [id number]                    2013 15:09:00                    2013 23:
59:59                    CLS                    Outpatient                    
BESSY OVIEDO DO                                                               
                

 

 [id number]                    2013 16:15:00                    2013 23:
59:59                    CLS                    Outpatient                     
                                                                               

 

 [id number]                    2012 09:38:00                    2012 23:59
:59                    CLS                    Outpatient                       
                                                                             

 

 [id number]                    2013 16:18:00                                 
                             Document Registration                             
                                                                       

 

 [id number]                    2017 07:54:00                    2017 08:54:00                    DIS                    Outpatient             
       AIDAN IRWIN MD                    [facility]  
                  CARD                    DISC DISORDER                

 

 [id number]                    2017 10:30:00                    2017 14:50:00                    DIS                    Outpatient             
       BRIELLE SERRATO ARNP                    [facility]
                    REHAB                    BACK PAIN WITH SCIATICA           
     

 

 [id number]                    2017 09:25:00                    2017 10:02:00                    DIS                    Outpatient             
       AIDAN IRWIN MD                    [facility]  
                  CARD                    DISC DISORDER                

 

 [id number]                    12/15/2016 11:44:00                    12/15/
2016 23:59:59                    CLS                    Outpatient             
       BRIELLE SERRATO ARNP                    [facility]
                    RAD                    SCREENING                

 

 [id number]                    12/15/2016 11:37:00                    12/15/
2016 23:59:59                    CLS                    Outpatient             
       BRIELLE SERRATO ARNP                    [facility]
                    RAD                    LUMBAGO                

 

 [id number]                    2014 07:39:00                    2014 23:59:59                    CLS                    Outpatient             
       DL LANDRY ARNP                    [facility]                    RAD                    FOLLOW UP L BREAST/ABN EVAL 
L BREAST                

 

 [id number]                    2013 07:46:00                    2013 23:59:59                    CLS                    Outpatient             
       DL LANDRY ARNP                    [facility]                    RAD                    6 MONTH FOLLOW UP          
      

 

 [id number]                    2013 08:06:00                    2013 23:59:59                    CLS                    Outpatient             
       LIZEBTH POTTS ARNP                    [facility]                    RAD                    NECK SPASMS                

 

 [id number]                    2013 14:20:00                    2013 23:59:59                    CLS                    Outpatient             
       DL LANDRY ARNP                    [facility]                    RAD                    ABN MAMMOGRAM              
  

 

 [id number]                    2013 10:49:00                    2013 23:59:59                    CLS                    Outpatient             
       FRANTZ, DL J ARNP                    [facility]                    RAD                    SCREENING                

 

 [id number]                    2017 16:47:00                           
                                   Document Registration                       
                                                                             

 

 [id number]                    2017 16:47:00                           
                                   Document Registration                       
                                                                             

 

 [id number]                    2017 16:47:00                           
                                   Document Registration                       
                                                                             

 

 [id number]                    10/17/2012 07:40:00                           
                                   Document Registration                       
                                                                             

 

 [id number]                    2012 15:45:00                           
                                   Document Registration                       
                                                                             

 

 [id number]                    2012 09:39:00                           
                                   Document Registration                       
                                                                             

 

 [id number]                    2011 12:12:00                           
                                   Document Registration                       
                                                                             

 

 [id number]                    2010 09:11:00                           
                                   Document Registration                       
                                                                             

 

 [id number]                    2009 16:18:00                           
                                   Document Registration                       
                                                                             

 

 [id number]                    2008 11:40:00                           
                                   Document Registration                       
                                                                             

 

 [id number]                    2008 13:27:00                           
                                   Document Registration                       
                                                                             

 

 [id number]                    04/15/2008 10:17:00                           
                                   Document Registration

## 2017-10-18 NOTE — XMS REPORT
[facility]

 Created on: 2016



Ruby Rosado

External Reference #: [id number]

: 1965

Sex: Female



Demographics







 Address  [address] [place]  [address]

 

 Home Phone  [phone]

 

 Preferred Language  Unknown

 

 Marital Status  Unknown

 

 Mormon Affiliation  Unknown

 

 Race  White

 

 Ethnic Group  Not  or 





Author







 Author  KIRT NEWMAN

 

 Organization  [employer]

 

 Address  Unknown

 

 Phone  Unavailable







Care Team Providers







 Care Team Member Name  Role  Phone

 

 KIRT NEWMAN  CP  Unavailable



                                                                



Allergies

          No Known Allergies                                                   
                                     



Problems

          





 Problem Type  Condition  Code  Onset Dates  Condition Status

 

 Problem  Insomnia, unspecified  780.52     Active

 

 Problem  Other screening breast examination  V76.19     Active

 

 Problem  Cervicalgia  723.1     Active

 

 Problem  Adjustment disorder with depressed mood  309.0     Active

 

 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

 

 Problem  Other specified menopausal and postmenopausal disorder  627.8     
Active

 

 Problem  Spasm of muscle  728.85     Active

 

 Problem  Other syndromes affecting cervical region  723.8     Active



                                                                               
                                                                               



Medications

          No Known Medications                                                 
                             



Results

          No Known Results                                                     
               



Summary Purpose

          [employer] Submission

## 2017-10-18 NOTE — XMS REPORT
[facility]

 Created on: 2016



Ashlee  Ruby

External Reference #: [id number]

: 1965

Sex: Female



Demographics







 Address  [address] [place]  [address]

 

 Home Phone  [phone]

 

 Preferred Language  Unknown

 

 Marital Status  Unknown

 

 Denominational Affiliation  Unknown

 

 Race  White

 

 Ethnic Group  Not  or 





Author







 Author  GULSHAN BAIRES

 

 Organization  [employer]

 

 Address  Unknown

 

 Phone  Unavailable







Care Team Providers







 Care Team Member Name  Role  Phone

 

 GULSHAN BAIRES  CP  Unavailable



                                                                



Allergies, Adverse Reactions, Alerts

          





 Substance  Reaction  Event Type

 

 N.K.D.A.  Info Not Available  Non Drug Allergy



                                                                               
         



Problems

          





 Problem Type  Condition  Code  Onset Dates  Condition Status

 

 Assessment  Splinter  T14.8     Active

 

 Problem  Insomnia, unspecified  780.52     Active

 

 Assessment  Fall, initial encounter  W19.XXXA     Active

 

 Assessment  Acute back pain with sciatica, right  M54.41     Active

 

 Assessment  Left wrist pain  M25.532     Active

 

 Problem  Other screening breast examination  V76.19     Active

 

 Problem  Cervicalgia  723.1     Active

 

 Problem  Adjustment disorder with depressed mood  309.0     Active

 

 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

 

 Problem  Other specified menopausal and postmenopausal disorder  627.8     
Active

 

 Problem  Spasm of muscle  728.85     Active

 

 Problem  Other syndromes affecting cervical region  723.8     Active



                                                                               
                                                                               
                                        



Medications

          





 Medication  Code System  Code  Instructions  Start Date  End Date  Status  
Dosage

 

 PredniSONE  NDC  [address]-14  50 MG Orally Once a day  Aug 14, 2016  Aug 19, 
2016     1 tablet

 

 PredniSONE  NDC  [address]-15  10 MG Orally Once a day  Aug 14, 2016        1 
tablet

 

 Ibuprofen  NDC  [address]-40  800 MG Orally Three times a day  Aug 14, 2016  
Aug 19, 2016     1 tablet



                                                                               
                             



Procedures

          





 Procedure  Coding System  Code  Date

 

 Office Visit, Est Pt., Level 3  CPT-4  97200  Aug 14, 2016



                                                                               
                   



Vital Signs

          





 Date/Time:  Aug 14, 2016

 

 Cardiac Monitoring Heart Rate  84 bpm

 

 Weight  144.2 lbs

 

 Height  64 in

 

 BMI  24.75 Index

 

 Blood Pressure Diastolic  76 mmHg

 

 Blood Pressure Systolic  118 mmHg



                                                                              



Results

          No Known Results                                                     
               



Summary Purpose

          [employer] Submission

## 2017-10-18 NOTE — XMS REPORT
[facility]

 Created on: 2017



Ruby Rosado

External Reference #: [id number]

: 1965

Sex: Female



Demographics







 Address  [address] [place]  [address]

 

 Preferred Language  Unknown

 

 Marital Status  Unknown

 

 Amish Affiliation  Unknown

 

 Race  Unknown

 

 Ethnic Group  Unknown





Author







 Author  BRIELLE SERRATO

 

 [facility]

 

 Address  [address] [place]  [address]



 

 Phone  [phone]







Care Team Providers







 Care Team Member Name  Role  Phone

 

 BRIELLE SERRATO  Unavailable  [phone]







PROBLEMS







 Type  Condition  ICD9-CM Code  XJS98-CW Code  Onset Dates  Condition Status  
SNOMED Code

 

 Problem  Lumbago with sciatica, left side     M54.42     Active  765845624

 

 Problem  Pain in right knee     M25.561     Active  37019857

 

 Problem  Pain in left knee     M25.562     Active  27187938

 

 Problem  Other chronic pain     G89.29     Active  92058098

 

 Problem  Lumbago with sciatica, right side     M54.41     Active  406094970

 

 Problem  Lumbago with sciatica, unspecified side     M54.40     Active  
719448334

 

 Problem  Back pain of lumbar region with sciatica     M54.40     Active  
[id number]







ALLERGIES

Unknown Allergies



SOCIAL HISTORY

No smoking Hx information available



PLAN OF CARE





VITAL SIGNS





MEDICATIONS

Unknown Medications



RESULTS

No Results



PROCEDURES

No Known procedures



IMMUNIZATIONS

No Known Immunizations
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
## 2017-10-18 NOTE — XMS REPORT
[facility]

 Created on: 2017



Ruby Rosado

External Reference #: [id number]

: 1965

Sex: Female



Demographics







 Address  [address] 

[place]  [address]

 

 Preferred Language  Unknown

 

 Marital Status  Unknown

 

 Catholic Affiliation  Unknown

 

 Race  Unknown

 

 Ethnic Group  Unknown





Author







 Author  EDWARD NAPOLES

 

 [facility]

 

 Address  [address] [place]  [address]



 

 Phone  [phone]







Care Team Providers







 Care Team Member Name  Role  Phone

 

 EDWARD NAPOLES  Unavailable  [phone]







PROBLEMS







 Type  Condition  ICD9-CM Code  QXC48-XA Code  Onset Dates  Condition Status  
SNOMED Code

 

 Problem  Lumbago with sciatica, right side     M54.41     Active  183365842

 

 Problem  Lumbago with sciatica, left side     M54.42     Active  046983111

 

 Problem  Primary osteoarthritis of both knees     M17.0     Active  125292060

 

 Problem  Pain in right knee     M25.561     Active  33023971

 

 Problem  Back pain of lumbar region with sciatica     M54.40     Active  
738295160

 

 Problem  Other chronic pain     G89.29     Active  33304142

 

 Problem  Pain in left knee     M25.562     Active  69292135

 

 Problem  Lumbago with sciatica, unspecified side     M54.40     Active  
178586460







ALLERGIES

No Information



SOCIAL HISTORY

Never Assessed



PLAN OF CARE







 Activity  Details









  









 Follow Up  prn Reason:







VITAL SIGNS







 Height  64 in  2017

 

 Blood pressure systolic  122 mmHg  2017

 

 Blood pressure diastolic  76 mmHg  2017







MEDICATIONS

Unknown Medications



RESULTS

No Results



PROCEDURES

No Known procedures



IMMUNIZATIONS

No Known Immunizations



MEDICAL (GENERAL) HISTORY







 Type  Description  Date

 

 Medical History  anxiety   

 

 Medical History  shingles   

 

 Medical History  bakers cyst- knee   

 

 Medical History  chronic back pain   

 

 Surgical History  lumpectomy left breast   

 

 Surgical History  right ankle surgery

## 2017-10-18 NOTE — XMS REPORT
[facility]

 Created on: 2016



Ashlee  Ruby

External Reference #: [id number]

: 1965

Sex: Female



Demographics







 Address  [address] [place] [address]  [address]

 

 Home Phone  [phone]

 

 Preferred Language  Unknown

 

 Marital Status  Unknown

 

 Hoahaoism Affiliation  Unknown

 

 Race  White

 

 Ethnic Group  Not  or 





Author







 Author  BRIELLE SERRATO

 

 Organization  [employer]

 

 Address  Unknown

 

 Phone  Unavailable







Care Team Providers







 Care Team Member Name  Role  Phone

 

 BRIELLE SERRATO  CP  Unavailable



                                                                



Allergies

          No Known Allergies                                                   
                                     



Problems

          





 Problem Type  Condition  Code  Onset Dates  Condition Status

 

 Problem  Lumbago with sciatica, right side  M54.41     Active

 

 Problem  Other chronic pain  G89.29     Active

 

 Problem  Lumbago with sciatica, left side  M54.42     Active

 

 Assessment  Lumbago with sciatica, right side  M54.41     Active



                                                                               
                                       



Medications

          





 Medication  Code System  Code  Instructions  Start Date  End Date  Status  
Dosage

 

 Benadryl  NDC  [address]-95  25 MG Orally            not defined

 

 Gabapentin  NDC  [address]-76  300 MG Orally 2 times a day  2016     
   1 capsule



                                                                               
         



Results

          No Known Results                                                     
               



Summary Purpose

          [employer] Submission

## 2017-10-18 NOTE — XMS REPORT
[facility]

 Created on: 2017



Susy Rosadoi

External Reference #: [id number]

: 1965

Sex: Female



Demographics







 Address  [address] [place]  [address]

 

 Preferred Language  Unknown

 

 Marital Status  Unknown

 

 Yazidi Affiliation  Unknown

 

 Race  Unknown

 

 Ethnic Group  Unknown





Author







 Author  BRIELLE SERRATO

 

 [facility]

 

 Address  [address] [place]  [address]



 

 Phone  [phone]







Care Team Providers







 Care Team Member Name  Role  Phone

 

 BRIELLE SERRATO  Unavailable  [phone]







PROBLEMS







 Type  Condition  ICD9-CM Code  NJT19-DC Code  Onset Dates  Condition Status  
SNOMED Code

 

 Assessment  Cervical cancer screening     Z12.4  12 Dec, 2016  Active  
809333600

 

 Assessment  Breast cancer screening     Z12.39  12 Dec, 2016  Active  783503640

 

 Problem  Lumbago with sciatica, unspecified side     M54.40     Active  
381135978

 

 Problem  Back pain of lumbar region with sciatica     M54.40     Active  
046465541

 

 Problem  Other chronic pain     G89.29     Active  48253878

 

 Assessment  Routine gynecological examination     V72.31  12 Dec, 2016  Active
  [id number]

 

 Problem  Lumbago with sciatica, left side     M54.42     Active  848985307

 

 Problem  Lumbago with sciatica, right side     M54.41     Active  111769682







ALLERGIES







 Substance  Reaction  Event Type  Date  Status

 

 N.K.D.A.  Unknown  Non Drug Allergy  12 Dec, 2016  Unknown







SOCIAL HISTORY

No smoking Hx information available



PLAN OF CARE







 Activity  Details









  









 Pending Test  CULTURE, GENITAL 

 

 Pending Test  PAP TEST W/ HPV REGARDLESS 

 

 Pending Test  Mammogram, Bilateral Screening 

 

    prn,Reason:



   



VITAL SIGNS







 Height  64 in  2016

 

 Weight  140.5 lbs  2016

 

 Heart Rate  88 bpm  2016

 

 Respiratory Rate  22   2016

 

 BMI  24.11 kg/m2  2016

 

 Blood pressure systolic  98 mmHg  2016

 

 Blood pressure diastolic  78 mmHg  2016







MEDICATIONS







 Medication  Instructions  Dosage  Frequency  Start Date  End Date  Duration  
Status

 

 Gabapentin 300 MG  Orally 3 times a day  1 capsule  8h          
Active







RESULTS







 Name  Result  Date  Reference Range

 

 PDF Report     2016   

 

 PDF Report1  [facility]      

 

 CULTURE, GENITAL     2016   

 

 Genital Culture, Routine  Final report      

 

 Result 1         

 

 PAP TEST W/ HPV REGARDLESS     2016   

 

 DIAGNOSIS:         

 

 Specimen adequacy:         

 

 Clinician provided ICD10:         

 

 Performed by:         

 

 .  .      

 

 Pathologist provided ICD10:         

 

 Note:         

 

 HPV, high-risk  Negative     Negative

 

 Mammogram, Bilateral Screening     2016-12-15   







PROCEDURES







 Procedure  Date Ordered  Related Diagnosis  Body Site

 

 SPECIMEN HANDLING  Dec 12, 2016      

 

 Office Visit, Est Pt., Level 4  Dec 12, 2016      

 

 CULTURE, BACTERIA, OTHER  Dec 12, 2016      







IMMUNIZATIONS

No Known Immunizations

## 2017-10-18 NOTE — XMS REPORT
[facility]

 Created on: 2017



Ruby Rosado

External Reference #: [id number]

: 1965

Sex: Female



Demographics







 Address  [address] [place]  [address]

 

 Preferred Language  Unknown

 

 Marital Status  Unknown

 

 Bahai Affiliation  Unknown

 

 Race  Unknown

 

 Ethnic Group  Unknown





Author







 Author  BRIELLE SERRATO

 

 [facility]

 

 Address  [address] [place]  [address]



 

 Phone  [phone]







Care Team Providers







 Care Team Member Name  Role  Phone

 

 BRIELLE SERRATO  Unavailable  [phone]







PROBLEMS







 Type  Condition  ICD9-CM Code  EIZ64-ZV Code  Onset Dates  Condition Status  
SNOMED Code

 

 Problem  Other chronic pain     G89.29     Active  03083129

 

 Problem  Pain in left knee     M25.562     Active  60598926

 

 Problem  Pain in right knee     M25.561     Active  76144560

 

 Problem  Lumbago with sciatica, left side     M54.42     Active  613388702

 

 Problem  Lumbago with sciatica, right side     M54.41     Active  633860468

 

 Problem  Lumbago with sciatica, unspecified side     M54.40     Active  
992057573

 

 Problem  Back pain of lumbar region with sciatica     M54.40     Active  
595485750







ALLERGIES

Unknown Allergies



SOCIAL HISTORY

No smoking Hx information available



PLAN OF CARE





VITAL SIGNS





MEDICATIONS

Unknown Medications



RESULTS

No Results



PROCEDURES

No Known procedures



IMMUNIZATIONS

No Known Immunizations

## 2017-10-18 NOTE — XMS REPORT
[facility]

 Created on: 2016



Ruby Rosado

External Reference #: [id number]

: 1965

Sex: Female



Demographics







 Address  [address] [place]  [address]

 

 Home Phone  [phone]

 

 Preferred Language  Unknown

 

 Marital Status  Unknown

 

 Quaker Affiliation  Unknown

 

 Race  White

 

 Ethnic Group  Not  or 





Author







 Author  KIRT NEWMAN

 

 [facility]  [employer]

 

 Address  Unknown

 

 Phone  Unavailable







Care Team Providers







 Care Team Member Name  Role  Phone

 

 KIRT NEWMAN  CP  Unavailable



                                                                



Allergies, Adverse Reactions, Alerts

          





 Substance  Reaction  Event Type

 

 N.K.D.A.  Info Not Available  Non Drug Allergy



                                                                               
         



Problems

          





 Problem Type  Condition  Code  Onset Dates  Condition Status

 

 Assessment  Encounter for immunization  Z23     Active

 

 Assessment  Back pain of lumbar region with sciatica  M54.40     Active



                                                                               
                   



Medications

          





 Medication  Code System  Code  Instructions  Start Date  End Date  Status  
Dosage

 

 PredniSONE  NDC  [address]-14  50 MG Orally Once a day  Aug 14, 2016  Aug 19, 
2016     1 tablet

 

 Ibuprofen  NDC  [address]-66  800 MG Orally Three times a day  Aug 14, 2016  
Aug 19, 2016     1 tablet



                                                                               
                   



Procedures

          





 Procedure  Coding System  Code  Date

 

 THER/PROPH/DIAG INJ, SC/IM  CPT-4  30582  Aug 16, 2016

 

 TDAP (BOOSTRIX)  CPT-4  08802  Aug 16, 2016

 

 TORADOL (IM) 60 MG/2ML (UP TO 15 MG)  CPT-4    Aug 16, 2016

 

 Office Visit, Est Pt., Level 4  CPT-4  16308  Aug 16, 2016

 

 SINGLE IMMUNIZATION ADMIN  CPT-4  08400  Aug 16, 2016



                                                                               
                                                           



Vital Signs

          





 Date/Time:  Aug 16, 2016

 

 Cardiac Monitoring Heart Rate  88 bpm

 

 Weight  149.2 lbs

 

 Height  64 in

 

 BMI  25.61 Index

 

 Blood Pressure Diastolic  82 mmHg

 

 Blood Pressure Systolic  144 mmHg



                                                                              



Results

          No Known Results                                                     
                                   



Immunizations

          





 Vaccine  Administration Date

 

 TDAP (BOOSTRIX)  Aug 16, 2016



                                                                    



Summary Purpose

          [employer] Submission

## 2017-10-18 NOTE — XMS REPORT
[facility]

 Created on: 2016



Ruby Rosado

External Reference #: [id number]

: 1965

Sex: Female



Demographics







 Address  [address] [place]  [address]

 

 Home Phone  [phone]

 

 Preferred Language  Unknown

 

 Marital Status  Unknown

 

 Samaritan Affiliation  Unknown

 

 Race  White

 

 Ethnic Group  Not  or 





Author







 Author  KIRT NEWMAN

 

 Organization  [employer]

 

 Address  Unknown

 

 Phone  Unavailable







Care Team Providers







 Care Team Member Name  Role  Phone

 

 KIRT NEWMAN  CP  Unavailable



                                                                



Allergies

          No Known Allergies                                                   
                                     



Problems

          No Known Problems                                                    
                                    



Medications

          No Known Medications                                                 
                             



Results

          No Known Results                                                     
               



Summary Purpose

          [employer] Submission

## 2017-10-18 NOTE — XMS REPORT
[facility]

 Created on: 2017



Ruby Rosado

External Reference #: [id number]

: 1965

Sex: Female



Demographics







 Address  [address] [place]  [address]

 

 Preferred Language  Unknown

 

 Marital Status  Unknown

 

 Jew Affiliation  Unknown

 

 Race  Unknown

 

 Ethnic Group  Unknown





Author







 Author  BRIELLE SERRATO

 

 [facility]

 

 Address  [address] [place]  [address]



 

 Phone  [phone]







Care Team Providers







 Care Team Member Name  Role  Phone

 

 BRIELLE SERRATO  Unavailable  [phone]







PROBLEMS







 Type  Condition  ICD9-CM Code  KPS26-OB Code  Onset Dates  Condition Status  
SNOMED Code

 

 Problem  Lumbago with sciatica, unspecified side     M54.40     Active  
847293528

 

 Problem  Back pain of lumbar region with sciatica     M54.40     Active  
326343939

 

 Problem  Other chronic pain     G89.29     Active  29413554

 

 Assessment  Lumbago with sciatica, unspecified side     M54.40  08 Dec, 2016  
Active  896574262

 

 Problem  Lumbago with sciatica, left side     M54.42     Active  361087081

 

 Problem  Lumbago with sciatica, right side     M54.41     Active  217228590







ALLERGIES

No Known Allergies



SOCIAL HISTORY

No smoking Hx information available



PLAN OF CARE







 Activity  Details









  









 Pending Test  MRI : Lumbar w/o contrast 

 

    ,Reason:



 



VITAL SIGNS





MEDICATIONS

No Known Medications



RESULTS







 Name  Result  Date  Reference Range

 

 MRI : Lumbar w/o contrast     2016-12-15   







PROCEDURES

No Known procedures



IMMUNIZATIONS

No Known Immunizations

## 2017-10-18 NOTE — XMS REPORT
[facility]

 Created on: 2017



Ruby Rosado

External Reference #: [id number]

: 1965

Sex: Female



Demographics







 Address  [address] [place]  [address]

 

 Preferred Language  Unknown

 

 Marital Status  Unknown

 

 Worship Affiliation  Unknown

 

 Race  Unknown

 

 Ethnic Group  Unknown





Author







 Author  BRIELLE SERRATO

 

 [facility]

 

 Address  [address] [place]  [address]



 

 Phone  [phone]







Care Team Providers







 Care Team Member Name  Role  Phone

 

 BRIELLE SERRATO  Unavailable  [phone]







PROBLEMS







 Type  Condition  ICD9-CM Code  DQR60-OX Code  Onset Dates  Condition Status  
SNOMED Code

 

 Problem  Back pain of lumbar region with sciatica     M54.40     Active  
947649330

 

 Problem  Lumbago with sciatica, left side     M54.42     Active  106200608

 

 Assessment  Back pain of lumbar region with sciatica     M54.40  08 Dec, 2016  
Active  054435980

 

 Assessment  Homeless     Z59.0  08 Dec, 2016  Active  00685405

 

 Problem  Lumbago with sciatica, right side     M54.41     Active  911126783

 

 Problem  Other chronic pain     G89.29     Active  13298294







ALLERGIES







 Substance  Reaction  Event Type  Date  Status

 

 N.K.D.A.  Unknown  Non Drug Allergy  08 Dec, 2016  Unknown







SOCIAL HISTORY

No smoking Hx information available



PLAN OF CARE





VITAL SIGNS







 Height  64 in  2016

 

 Weight  140.7 lbs  2016

 

 Heart Rate  80 bpm  2016

 

 Respiratory Rate  18   2016

 

 BMI  24.15 kg/m2  2016

 

 Blood pressure systolic  108 mmHg  2016

 

 Blood pressure diastolic  72 mmHg  2016







MEDICATIONS







 Medication  Instructions  Dosage  Frequency  Start Date  End Date  Duration  
Status

 

 Gabapentin 300 MG  Orally 3 times a day  1 capsule  8h          
Active







RESULTS

No Results



PROCEDURES







 Procedure  Date Ordered  Related Diagnosis  Body Site

 

 Office Visit, Est Pt., Level 3  Dec 08, 2016      







IMMUNIZATIONS

No Known Immunizations

## 2018-12-30 ENCOUNTER — HOSPITAL ENCOUNTER (EMERGENCY)
Dept: HOSPITAL 75 - ER | Age: 53
Discharge: HOME | End: 2018-12-30
Payer: SELF-PAY

## 2018-12-30 VITALS — BODY MASS INDEX: 24.75 KG/M2 | WEIGHT: 145 LBS | HEIGHT: 64 IN

## 2018-12-30 VITALS — SYSTOLIC BLOOD PRESSURE: 110 MMHG | DIASTOLIC BLOOD PRESSURE: 82 MMHG
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
## 2018-12-30 DIAGNOSIS — Z77.22: ICD-10-CM

## 2018-12-30 DIAGNOSIS — S01.85XA: Primary | ICD-10-CM

## 2018-12-30 DIAGNOSIS — Z23: ICD-10-CM

## 2018-12-30 PROCEDURE — 90715 TDAP VACCINE 7 YRS/> IM: CPT
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
## 2018-12-30 NOTE — XMS REPORT
Continuity of Care Document

 Created on: 2018



LUNA REYES

External Reference #: [id number]

: 1965

Sex: Female



Demographics







 Address  [address] [place] [address]

 

 Home Phone  [phone] x

 

 Preferred Language  Unknown

 

 Marital Status  Unknown

 

 Jehovah's witness Affiliation  Unknown

 

 Race  Unknown

 

 Ethnic Group  Unknown





Author







 Author  [facility]

 

 Address  Unknown

 

 Phone  Unavailable



              



Allergies

      





 Active            Description            Code            Type            
Severity            Reaction            Onset            Reported/Identified   
         Relationship to Patient            Clinical Status        

 

 Yes            Methotrexate                         Drug Allergy              
                                     05/10/2011                                
  

 

 Yes            Methotrexate                         Drug Allergy            N/
A            N/A                         05/10/2011                            
      

 

 Yes            No Allergy Information Available            [id number]         
   Drug Allergy            Unknown            N/A                         2012                                  



                      



Medications

      



There is no data.                  



Problems

      





 Date Dx Coded            Attending            Type            Code            
Diagnosis            Diagnosed By        

 

 [id number]            BRIELLE SERRATO            Ot            M54.41    
        LUMBAGO WITH SCIATICA, RIGHT SIDE                     

 

 2008                                      296.90            MOOD 
DISORDER                     

 

 2008                                      401.1            HYPERTENSION, 
BENIGN ESSENTIAL                     

 

 2008                                      466.0            Bronchitis, 
Acute                     

 

 2008                                      296.90            MOOD 
DISORDER                     

 

 2008                                      401.1            HYPERTENSION, 
BENIGN ESSENTIAL                     

 

 2008                                      466.0            Bronchitis, 
Acute                     

 

 2008            OVIEDO DO, BESSY K                         296.90         
   MOOD DISORDER                     

 

 2008            IVELISSE MATTHEWS BESSY K                         401.1          
  HYPERTENSION, BENIGN ESSENTIAL                     

 

 2008            OVIEDO DO BESSY K                         466.0          
  Bronchitis, Acute                     

 

 2008                                      296.90            MOOD 
DISORDER                     

 

 2008                                      401.1            HYPERTENSION, 
BENIGN ESSENTIAL                     

 

 2008                                      466.0            Bronchitis, 
Acute                     

 

 2008            CALDWELLCHIOMA HART LIZBETH N                         
296.90            MOOD DISORDER                     

 

 2008            CALDWELL CASHERO APRALFA, LIZBETH N                         
401.1            HYPERTENSION, BENIGN ESSENTIAL                     

 

 2008            CALDWELL CASHERO APRALFA, LIZBETH N                         
466.0            Bronchitis, Acute                     

 

 2008            OVIEDO DO BESSY K                         296.90         
   MOOD DISORDER                     

 

 2008            OVIEDO DO BESSY K                         401.1          
  HYPERTENSION, BENIGN ESSENTIAL                     

 

 2008            OVIEDO DO BESSY K                         466.0          
  Bronchitis, Acute                     

 

 2008            OVIEDO DO BESSY K                         296.90         
   MOOD DISORDER                     

 

 2008            OVIEDO DO BESSY K                         401.1          
  HYPERTENSION, BENIGN ESSENTIAL                     

 

 2008            OVIEDO DO BESSY K                         466.0          
  Bronchitis, Acute                     

 

 2008            OVIEDO DO BESSY K                         296.90         
   MOOD DISORDER                     

 

 2008            OVIEDO DO BESSY K                         401.1          
  HYPERTENSION, BENIGN ESSENTIAL                     

 

 2008            OVIEDO DO BESSY K                         466.0          
  Bronchitis, Acute                     

 

 2008                                      465.9            Upper 
Respiratory Infection                     

 

 2008                                      465.9            Upper 
Respiratory Infection                     

 

 2008            OVIEDO DO BESSY K                         465.9          
  Upper Respiratory Infection                     

 

 2008                                      465.9            Upper 
Respiratory Infection                     

 

 2008            SAMARA ORTIZCY N                         
465.9            Upper Respiratory Infection                     

 

 2008            OVIEDO DO BESSY K                         465.9          
  Upper Respiratory Infection                     

 

 2008            OVIEDO DO BESSY K                         465.9          
  Upper Respiratory Infection                     

 

 2008            OVIEDO DO BESSY K                         465.9          
  Upper Respiratory Infection                     

 

 2008                                      V25.40            
CONTRACEPTIVE SURVEILLANCE UNSPECIFIED                     

 

 2008                                      V25.40            
CONTRACEPTIVE SURVEILLANCE UNSPECIFIED                     

 

 2008            IVELISSE MATTHEWS BESSY K                         V25.40         
   CONTRACEPTIVE SURVEILLANCE UNSPECIFIED                     

 

 2008                                      V25.40            
CONTRACEPTIVE SURVEILLANCE UNSPECIFIED                     

 

 2008            LIZBETH ORTIZ N                         
V25.40            CONTRACEPTIVE SURVEILLANCE UNSPECIFIED                     

 

 2008            OVIEDO DO BESSY K                         V25.40         
   CONTRACEPTIVE SURVEILLANCE UNSPECIFIED                     

 

 2008            OVIEDO DO BESSY K                         V25.40         
   CONTRACEPTIVE SURVEILLANCE UNSPECIFIED                     

 

 2008            OVIEDO DO BESSY K                         V25.40         
   CONTRACEPTIVE SURVEILLANCE UNSPECIFIED                     

 

 10/09/2008                                      V25.49            SURVEILLANCE 
OF OTHER CONTRACEPTIVE METHOD                     

 

 10/09/2008                                      V25.49            SURVEILLANCE 
OF OTHER CONTRACEPTIVE METHOD                     

 

 10/09/2008            IVELISSE MATTHEWS BESSY K                         V25.49         
   SURVEILLANCE OF OTHER CONTRACEPTIVE METHOD                     

 

 10/09/2008                                      V25.49            SURVEILLANCE 
OF OTHER CONTRACEPTIVE METHOD                     

 

 10/09/2008            SAMARA ORTIZCY N                         
V25.49            SURVEILLANCE OF OTHER CONTRACEPTIVE METHOD                   
  

 

 10/09/2008            OVIEDO DO BESSY K                         V25.49         
   SURVEILLANCE OF OTHER CONTRACEPTIVE METHOD                     

 

 10/09/2008            OVIEDO DO BESSY K                         V25.49         
   SURVEILLANCE OF OTHER CONTRACEPTIVE METHOD                     

 

 10/09/2008            OVIEDO DO BESSY K                         V25.49         
   SURVEILLANCE OF OTHER CONTRACEPTIVE METHOD                     

 

 2009                                      381.4            Otitis Media 
Nonsuppurative Both Ears                     

 

 2009                                      462            Acute 
Pharyngitis                     

 

 2009                                      787.91            Diarrhea    
                 

 

 2009                                      381.4            Otitis Media 
Nonsuppurative Both Ears                     

 

 2009                                      462            Acute 
Pharyngitis                     

 

 2009                                      787.91            Diarrhea    
                 

 

 2009            OVIEDO DO, BESSY K                         381.4          
  Otitis Media Nonsuppurative Both Ears                     

 

 2009            OVIEDO DO, BESSY K                         462            
Acute Pharyngitis                     

 

 2009            OVIEDO DO, BESSY K                         787.91         
   Diarrhea                     

 

 2009                                      381.4            Otitis Media 
Nonsuppurative Both Ears                     

 

 2009                                      462            Acute 
Pharyngitis                     

 

 2009                                      787.91            Diarrhea    
                 

 

 2009            CALDWELL CASHERO APRN, LIZBETH N                         
381.4            Otitis Media Nonsuppurative Both Ears                     

 

 2009            CALDWELL CASHERO APRN, LIZBETH N                         
462            Acute Pharyngitis                     

 

 2009            CALDWELL CASHERO APRN, LIZBETH N                         
787.91            Diarrhea                     

 

 2009            OVIEDO DO, BESSY K                         381.4          
  Otitis Media Nonsuppurative Both Ears                     

 

 2009            OVIEDO DO, BESSY K                         462            
Acute Pharyngitis                     

 

 2009            OVIEDO DO, BESSY K                         787.91         
   Diarrhea                     

 

 2009            OVIEDO DO, BESSY K                         381.4          
  Otitis Media Nonsuppurative Both Ears                     

 

 2009            OVIEDO DO, BESSY K                         462            
Acute Pharyngitis                     

 

 2009            OVIEDO DO, BESSY K                         787.91         
   Diarrhea                     

 

 2009            OVIEDO DO, BESSY K                         381.4          
  Otitis Media Nonsuppurative Both Ears                     

 

 2009            OVIEDO DO, BESSY K                         462            
Acute Pharyngitis                     

 

 2009            OVIEDO DO, BESSY K                         787.91         
   Diarrhea                     

 

 10/30/2009                                      627.9            MENOPAUSAL 
AND POSTMENOPAUSAL DISORDER UNSPECIFIED                     

 

 10/30/2009                                      719.08            EFFUSION OF 
JOINT, OTHER SPECIFIED SITES                     

 

 10/30/2009                                      627.9            MENOPAUSAL 
AND POSTMENOPAUSAL DISORDER UNSPECIFIED                     

 

 10/30/2009                                      719.08            EFFUSION OF 
JOINT, OTHER SPECIFIED SITES                     

 

 10/30/2009            OVIEDO DO BESSY K                         627.9          
  MENOPAUSAL AND POSTMENOPAUSAL DISORDER UNSPECIFIED                     

 

 10/30/2009            OVIEDO DIDI MATTHEWSA K                         719.08         
   EFFUSION OF JOINT, OTHER SPECIFIED SITES                     

 

 10/30/2009                                      627.9            MENOPAUSAL 
AND POSTMENOPAUSAL DISORDER UNSPECIFIED                     

 

 10/30/2009                                      719.08            EFFUSION OF 
JOINT, OTHER SPECIFIED SITES                     

 

 10/30/2009            PAULETTE STUBBS APRALFA LIZBETH N                         
627.9            MENOPAUSAL AND POSTMENOPAUSAL DISORDER UNSPECIFIED            
         

 

 10/30/2009            PAULETTE STUBBS APRALFA LIZBETH N                         
719.08            EFFUSION OF JOINT, OTHER SPECIFIED SITES                     

 

 10/30/2009            OVIEDO DO, BESSY K                         627.9          
  MENOPAUSAL AND POSTMENOPAUSAL DISORDER UNSPECIFIED                     

 

 10/30/2009            OVIEDO DO, BESSY K                         719.08         
   EFFUSION OF JOINT, OTHER SPECIFIED SITES                     

 

 10/30/2009            OVIEDO DO, BESSY K                         627.9          
  MENOPAUSAL AND POSTMENOPAUSAL DISORDER UNSPECIFIED                     

 

 10/30/2009            OVIEDO DO, BESSY K                         719.08         
   EFFUSION OF JOINT, OTHER SPECIFIED SITES                     

 

 10/30/2009            OVIEDO DO, BESSY K                         627.9          
  MENOPAUSAL AND POSTMENOPAUSAL DISORDER UNSPECIFIED                     

 

 10/30/2009            OVIEDO DO, BESSY K                         719.08         
   EFFUSION OF JOINT, OTHER SPECIFIED SITES                     

 

 2009                                      719.46            PAIN IN JOINT
, LOWER LEG                     

 

 2009                                      719.46            PAIN IN JOINT
, LOWER LEG                     

 

 2009            OVIEDO DO, BESSY K                         719.46         
   PAIN IN JOINT, LOWER LEG                     

 

 2009                                      719.46            PAIN IN JOINT
, LOWER LEG                     

 

 2009            CALDWELL REBECCAROBER APRALFA LIZBETH N                         
719.46            PAIN IN JOINT, LOWER LEG                     

 

 2009            OVIEDO DO, BESSY K                         719.46         
   PAIN IN JOINT, LOWER LEG                     

 

 2009            OVIEDO DO, BESSY K                         719.46         
   PAIN IN JOINT, LOWER LEG                     

 

 2009            OVIEDO DO, BESSY K                         719.46         
   PAIN IN JOINT, LOWER LEG                     

 

 2009                                      715.96            
OSTEOARTHRITIS KNEE                     

 

 2009                                      715.96            
OSTEOARTHRITIS KNEE                     

 

 2009            OVIEDO DO, BESSY K                         715.96         
   OSTEOARTHRITIS KNEE                     

 

 2009                                      715.96            
OSTEOARTHRITIS KNEE                     

 

 2009            PAULETTE FERNANDEZROBER APRALFA LIZBETH N                         
715.96            OSTEOARTHRITIS KNEE                     

 

 2009            OVIEDO DO, BESSY K                         715.96         
   OSTEOARTHRITIS KNEE                     

 

 2009            OVIEDO DO, BESSY K                         715.96         
   OSTEOARTHRITIS KNEE                     

 

 2009            OVIEDO DO, BESSY K                         715.96         
   OSTEOARTHRITIS KNEE                     

 

 2010                                      388.70            Otalgia, 
Unspecified                     

 

 2010                                      388.70            Otalgia, 
Unspecified                     

 

 2010            OVIEDO DO, BESSY K                         388.70         
   Otalgia, Unspecified                     

 

 2010                                      388.70            Otalgia, 
Unspecified                     

 

 2010            LIZBETH ORTIZ N                         
388.70            Otalgia, Unspecified                     

 

 2010            OVIEDO DO, BESSY K                         388.70         
   Otalgia, Unspecified                     

 

 2010            OVIEDO DO, BESSY K                         388.70         
   Otalgia, Unspecified                     

 

 2010            OVIEDO DO, BESSY K                         388.70         
   Otalgia, Unspecified                     

 

 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 
OF EUSTACHIAN TUBE                     

 

 2010                                      719.49            PAIN IN JOINT
, MULTIPLE SITES                     

 

 2010                                      780.79            OTHER 
MALAISE AND FATIGUE                     

 

 2010                                      783.1            ABNORMAL 
WEIGHT GAIN                     

 

 2010                                      E987.9            Falling From 
Unspecified Site, Undetermined Whether Accidentally Or Purposely Inflicted     
                

 

 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 
OF EUSTACHIAN TUBE                     

 

 2010                                      719.49            PAIN IN JOINT
, MULTIPLE SITES                     

 

 2010                                      780.79            OTHER 
MALAISE AND FATIGUE                     

 

 2010                                      783.1            ABNORMAL 
WEIGHT GAIN                     

 

 2010                                      E987.9            Falling From 
Unspecified Site, Undetermined Whether Accidentally Or Purposely Inflicted     
                

 

 2010            OVIEDO DO, BESSY K                         381.81         
   DYSFUNCTION OF EUSTACHIAN TUBE                     

 

 2010            OVIEDO DO, BESSY K                         719.49         
   PAIN IN JOINT, MULTIPLE SITES                     

 

 2010            OVIEDO DO, BESSY K                         780.79         
   OTHER MALAISE AND FATIGUE                     

 

 2010            OVIEDO DO, BESSY K                         783.1          
  ABNORMAL WEIGHT GAIN                     

 

 2010            OVIEDO DO, BESSY K                         E987.9         
   Falling From Unspecified Site, Undetermined Whether Accidentally Or 
Purposely Inflicted                     

 

 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 
OF EUSTACHIAN TUBE                     

 

 2010                                      719.49            PAIN IN JOINT
, MULTIPLE SITES                     

 

 2010                                      780.79            OTHER 
MALAISE AND FATIGUE                     

 

 2010                                      783.1            ABNORMAL 
WEIGHT GAIN                     

 

 2010                                      E987.9            Falling From 
Unspecified Site, Undetermined Whether Accidentally Or Purposely Inflicted     
                

 

 2010            LIZBETH ORTIZ N                         
381.81            DYSFUNCTION OF EUSTACHIAN TUBE                     

 

 2010            PAULETTE HART, LIZBETH N                         
719.49            PAIN IN JOINT, MULTIPLE SITES                     

 

 2010            PAULETTE HART, LIZBETH N                         
780.79            OTHER MALAISE AND FATIGUE                     

 

 2010            PAULETTE HART, LIZBETH N                         
783.1            ABNORMAL WEIGHT GAIN                     

 

 2010            PAULETTE HART, LIZBETH N                         
E987.9            Falling From Unspecified Site, Undetermined Whether 
Accidentally Or Purposely Inflicted                     

 

 2010            OVIEDO DO, BESSY K                         381.81         
   DYSFUNCTION OF EUSTACHIAN TUBE                     

 

 2010            OVIEDO DO, BESSY K                         719.49         
   PAIN IN JOINT, MULTIPLE SITES                     

 

 2010            OVIEDO DO, BESSY K                         780.79         
   OTHER MALAISE AND FATIGUE                     

 

 2010            OVIEDO DO, BESSY K                         783.1          
  ABNORMAL WEIGHT GAIN                     

 

 2010            OVIEDO DO, BESSY K                         E987.9         
   Falling From Unspecified Site, Undetermined Whether Accidentally Or 
Purposely Inflicted                     

 

 2010            OVIEDO DO, BESSY K                         381.81         
   DYSFUNCTION OF EUSTACHIAN TUBE                     

 

 2010            OVIEDO DO, BESSY K                         719.49         
   PAIN IN JOINT, MULTIPLE SITES                     

 

 2010            OVIEDO DO, BESSY K                         780.79         
   OTHER MALAISE AND FATIGUE                     

 

 2010            OVIEDO DO, BESSY K                         783.1          
  ABNORMAL WEIGHT GAIN                     

 

 2010            OVIEDO DO, BESSY K                         E987.9         
   Falling From Unspecified Site, Undetermined Whether Accidentally Or 
Purposely Inflicted                     

 

 2010            OVIEDO DO, BESSY K                         381.81         
   DYSFUNCTION OF EUSTACHIAN TUBE                     

 

 2010            OVIEDO DO, BESSY K                         719.49         
   PAIN IN JOINT, MULTIPLE SITES                     

 

 2010            OVIEDO DO, BESSY K                         780.79         
   OTHER MALAISE AND FATIGUE                     

 

 2010            OVIEDO DO, BESSY K                         783.1          
  ABNORMAL WEIGHT GAIN                     

 

 2010            OVIEDO DO, BESSY K                         E987.9         
   Falling From Unspecified Site, Undetermined Whether Accidentally Or 
Purposely Inflicted                     

 

 2010                                      729.5            PAIN IN LIMB 
                    

 

 2010                                      729.5            PAIN IN LIMB 
                    

 

 2010            OVIEDO DO, BESSY K                         729.5          
  PAIN IN LIMB                     

 

 2010                                      729.5            PAIN IN LIMB 
                    

 

 2010            LIZBETH ORTIZ N                         
729.5            PAIN IN LIMB                     

 

 2010            OVIEDO DO, BESSY K                         729.5          
  PAIN IN LIMB                     

 

 2010            OVIEDO DO, BESSY K                         729.5          
  PAIN IN LIMB                     

 

 2010            OVIEDO DO, BESSY K                         729.5          
  PAIN IN LIMB                     

 

 2010                                      053.9            HERPES ZOSTER
, WITHOUT MENTION OF COMPLICATION                     

 

 2010                                      692.9            Contact 
Dermatitis And Other Eczema, Unspecified Cause                     

 

 2010                                      053.9            HERPES ZOSTER
, WITHOUT MENTION OF COMPLICATION                     

 

 2010                                      692.9            Contact 
Dermatitis And Other Eczema, Unspecified Cause                     

 

 2010            OVIEDO DO, BESSY K                         053.9          
  HERPES ZOSTER, WITHOUT MENTION OF COMPLICATION                     

 

 2010            OVIEDO DO, BESSY K                         692.9          
  Contact Dermatitis And Other Eczema, Unspecified Cause                     

 

 2010                                      053.9            HERPES ZOSTER
, WITHOUT MENTION OF COMPLICATION                     

 

 2010                                      692.9            Contact 
Dermatitis And Other Eczema, Unspecified Cause                     

 

 2010            LIZBETH ORTIZ N                         
053.9            HERPES ZOSTER, WITHOUT MENTION OF COMPLICATION                
     

 

 2010            LIZBETH ORTIZ N                         
692.9            Contact Dermatitis And Other Eczema, Unspecified Cause        
             

 

 2010            OVIEDO DO, BESSY K                         053.9          
  HERPES ZOSTER, WITHOUT MENTION OF COMPLICATION                     

 

 2010            OVIEDO DO, BESSY K                         692.9          
  Contact Dermatitis And Other Eczema, Unspecified Cause                     

 

 2010            OVIEDO DO, BESSY K                         053.9          
  HERPES ZOSTER, WITHOUT MENTION OF COMPLICATION                     

 

 2010            OVIEDO DO, BESSY K                         692.9          
  Contact Dermatitis And Other Eczema, Unspecified Cause                     

 

 2010            OVIEDO DO, BESSY K                         053.9          
  HERPES ZOSTER, WITHOUT MENTION OF COMPLICATION                     

 

 2010            OVIEDO DO, BESSY K                         692.9          
  Contact Dermatitis And Other Eczema, Unspecified Cause                     

 

 2010                                      053.9            HERPES ZOSTER
, WITHOUT MENTION OF COMPLICATION                     

 

 2010                                      300.00            ANXIETY 
UNSPEC                     

 

 2010                                      698.9            Pruritus Nos 
                    

 

 2010                                      053.9            HERPES ZOSTER
, WITHOUT MENTION OF COMPLICATION                     

 

 2010                                      300.00            ANXIETY 
UNSPEC                     

 

 2010                                      698.9            Pruritus Nos 
                    

 

 2010            OVIEDO DO, BESSY K                         053.9          
  HERPES ZOSTER, WITHOUT MENTION OF COMPLICATION                     

 

 2010            OVIEDO DO, BESSY K                         300.00         
   ANXIETY UNSPEC                     

 

 2010            OVIEDO DO, BESSY K                         698.9          
  Pruritus Nos                     

 

 2010                                      053.9            HERPES ZOSTER
, WITHOUT MENTION OF COMPLICATION                     

 

 2010                                      300.00            ANXIETY 
UNSPEC                     

 

 2010                                      698.9            Pruritus Nos 
                    

 

 2010            CALDWELL CASHERO APRN, LIZBETH N                         
053.9            HERPES ZOSTER, WITHOUT MENTION OF COMPLICATION                
     

 

 2010            CALDWELL CASHERO APRN, LIZBETH N                         
300.00            ANXIETY UNSPEC                     

 

 2010            CALDWELL CASHERO APRN, LIZBETH N                         
698.9            Pruritus Nos                     

 

 2010            OVIEDO DO, BESSY K                         053.9          
  HERPES ZOSTER, WITHOUT MENTION OF COMPLICATION                     

 

 2010            OVIEDO DO, BESSY K                         300.00         
   ANXIETY UNSPEC                     

 

 2010            OVIEDO DO, BESSY K                         698.9          
  Pruritus Nos                     

 

 2010            OVIEDO DO, BESSY K                         053.9          
  HERPES ZOSTER, WITHOUT MENTION OF COMPLICATION                     

 

 2010            OVIEDO DO, BESSY K                         300.00         
   ANXIETY UNSPEC                     

 

 2010            OVIEDO DO, BESSY K                         698.9          
  Pruritus Nos                     

 

 2010            OVIEDO DO, BESSY K                         053.9          
  HERPES ZOSTER, WITHOUT MENTION OF COMPLICATION                     

 

 2010            OVIEDO DO, BESSY K                         300.00         
   ANXIETY UNSPEC                     

 

 2010            OVIEDO DO, BESSY K                         698.9          
  Pruritus Nos                     

 

 06/15/2010                                      724.5            BACKACHE 
UNSPECIFIED                     

 

 06/15/2010                                      788.1            Dysuria      
               

 

 06/15/2010                                      788.63            Urinary 
Urgency                     

 

 06/15/2010                                      724.5            BACKACHE 
UNSPECIFIED                     

 

 06/15/2010                                      788.1            Dysuria      
               

 

 06/15/2010                                      788.63            Urinary 
Urgency                     

 

 06/15/2010            OVIEDO DO, BESSY K                         724.5          
  BACKACHE UNSPECIFIED                     

 

 06/15/2010            OVIEDO DO, BESSY K                         788.1          
  Dysuria                     

 

 06/15/2010            OVIEDO DO, BESSY K                         788.63         
   Urinary Urgency                     

 

 06/15/2010                                      724.5            BACKACHE 
UNSPECIFIED                     

 

 06/15/2010                                      788.1            Dysuria      
               

 

 06/15/2010                                      788.63            Urinary 
Urgency                     

 

 06/15/2010            CALDWELL CASHERO APRN, LIZBETH N                         
724.5            BACKACHE UNSPECIFIED                     

 

 06/15/2010            CALDWELL CASHERO APRN, LIZBETH N                         
788.1            Dysuria                     

 

 06/15/2010            CALDWELL CASHERO APRN, LIZBETH N                         
788.63            Urinary Urgency                     

 

 06/15/2010            OVIEDO DO, BESSY K                         724.5          
  BACKACHE UNSPECIFIED                     

 

 06/15/2010            OVIEDO DO, BESSY K                         788.1          
  Dysuria                     

 

 06/15/2010            OVIEDO DO, BESSY K                         788.63         
   Urinary Urgency                     

 

 06/15/2010            OVIEDO DO, BESSY K                         724.5          
  BACKACHE UNSPECIFIED                     

 

 06/15/2010            OVIEDO DO, BESSY K                         788.1          
  Dysuria                     

 

 06/15/2010            OVIEDO DO, BESSY K                         788.63         
   Urinary Urgency                     

 

 06/15/2010            OVIEDO DO, BESSY K                         724.5          
  BACKACHE UNSPECIFIED                     

 

 06/15/2010            OVIEDO DO, BESSY K                         788.1          
  Dysuria                     

 

 06/15/2010            OVIEDO DO, BESSY K                         788.63         
   Urinary Urgency                     

 

 2010                                      054.10            HERPES 
SIMPLEX GENITAL                     

 

 2010                                      922.1            Contusion Of 
Trunk, Chest Wall                     

 

 2010                                      V68.1            Issue Of 
Repeat Prescriptions                     

 

 2010                                      054.10            HERPES 
SIMPLEX GENITAL                     

 

 2010                                      922.1            Contusion Of 
Trunk, Chest Wall                     

 

 2010                                      V68.1            Issue Of 
Repeat Prescriptions                     

 

 2010            OVIEDO DO, BESSY K                         054.10         
   HERPES SIMPLEX GENITAL                     

 

 2010            OVIEDO DO, BESSY K                         922.1          
  Contusion Of Trunk, Chest Wall                     

 

 2010            OVIEDO DO, BESSY K                         V68.1          
  Issue Of Repeat Prescriptions                     

 

 2010                                      054.10            HERPES 
SIMPLEX GENITAL                     

 

 2010                                      922.1            Contusion Of 
Trunk, Chest Wall                     

 

 2010                                      V68.1            Issue Of 
Repeat Prescriptions                     

 

 2010            CALDWELL CASHERO APRN, LIZBETH N                         
054.10            HERPES SIMPLEX GENITAL                     

 

 2010            CALDWELL CASHERO APRN, LIZBETH N                         
922.1            Contusion Of Trunk, Chest Wall                     

 

 2010            CALDWELL CASHROBER APRN LIZBETH N                         
V68.1            Issue Of Repeat Prescriptions                     

 

 2010            OVIEDO DO, BESSY K                         054.10         
   HERPES SIMPLEX GENITAL                     

 

 2010            OVIEDO DO, BESSY K                         922.1          
  Contusion Of Trunk, Chest Wall                     

 

 2010            OVIEDO DO, BESSY K                         V68.1          
  Issue Of Repeat Prescriptions                     

 

 2010            BESSY OVIEDO DO                         054.10         
   HERPES SIMPLEX GENITAL                     

 

 2010            BESSY OVIEDO DO                         922.1          
  Contusion Of Trunk, Chest Wall                     

 

 2010            BESSY OVIEDO DO                         V68.1          
  Issue Of Repeat Prescriptions                     

 

 2010            BESSY OVIEDO DO                         054.10         
   HERPES SIMPLEX GENITAL                     

 

 2010            BESSY OVIEDO DO                         922.1          
  Contusion Of Trunk, Chest Wall                     

 

 2010            BESSY OVIEDO DO                         V68.1          
  Issue Of Repeat Prescriptions                     

 

 2010                                      300.02            AN GEN 
ANXIETY                     

 

 2010                                      300.02            AN GEN 
ANXIETY                     

 

 2010            BESSY OVIEDO DO                         300.02         
   AN GEN ANXIETY                     

 

 2010                                      300.02            AN GEN 
ANXIETY                     

 

 2010            LIZBETH ORTIZ N                         
300.02            AN GEN ANXIETY                     

 

 2010            BESSY OVIEDO DO                         300.02         
   AN GEN ANXIETY                     

 

 2010            BESSY OVIEDO DO                         300.02         
   AN GEN ANXIETY                     

 

 2010            BESSY OVIEDO DO                         300.02         
   AN GEN ANXIETY                     

 

 2010                                      623.5            Leukorrhea 
Not Specified As Infective                     

 

 2010                                      623.5            Leukorrhea 
Not Specified As Infective                     

 

 2010            BESSY OVIEDO DO                         623.5          
  Leukorrhea Not Specified As Infective                     

 

 2010                                      623.5            Leukorrhea 
Not Specified As Infective                     

 

 2010            LIZBETH ORTIZ N                         
623.5            Leukorrhea Not Specified As Infective                     

 

 2010            BESSY OVIEDO DO                         623.5          
  Leukorrhea Not Specified As Infective                     

 

 2010            BESSY OVIEDO DO                         623.5          
  Leukorrhea Not Specified As Infective                     

 

 2010            BESSY OVIEDO DO                         623.5          
  Leukorrhea Not Specified As Infective                     

 

 2011                                      112.1            Candidiasis 
Of Vulva And Vagina                     

 

 2011                                      305.1            NONDEPENDENT 
TOBACCO USE DISORDER                     

 

 2011                                      727.51            SYNOVIAL 
CYST OF POPLITEAL SPACE                     

 

 2011                                      112.1            Candidiasis 
Of Vulva And Vagina                     

 

 2011                                      305.1            NONDEPENDENT 
TOBACCO USE DISORDER                     

 

 2011                                      727.51            SYNOVIAL 
CYST OF POPLITEAL SPACE                     

 

 2011            BESSY OVIEDO DO                         112.1          
  Candidiasis Of Vulva And Vagina                     

 

 2011            DIDI OVIEDO DOA K                         305.1          
  NONDEPENDENT TOBACCO USE DISORDER                     

 

 2011            DIDI OVIEDO DOA K                         727.51         
   SYNOVIAL CYST OF POPLITEAL SPACE                     

 

 2011                                      112.1            Candidiasis 
Of Vulva And Vagina                     

 

 2011                                      305.1            NONDEPENDENT 
TOBACCO USE DISORDER                     

 

 2011                                      727.51            SYNOVIAL 
CYST OF POPLITEAL SPACE                     

 

 2011            LIZBETH ORTIZ N                         
112.1            Candidiasis Of Vulva And Vagina                     

 

 2011            LIZBETH ORTIZ N                         
305.1            NONDEPENDENT TOBACCO USE DISORDER                     

 

 2011            LIZBETH ORTIZ N                         
727.51            SYNOVIAL CYST OF POPLITEAL SPACE                     

 

 2011            DIDI OVIEDO DOA K                         112.1          
  Candidiasis Of Vulva And Vagina                     

 

 2011            DIDI OVIEDO DOA K                         305.1          
  NONDEPENDENT TOBACCO USE DISORDER                     

 

 2011            BESSY OVIEDO DO                         727.51         
   SYNOVIAL CYST OF POPLITEAL SPACE                     

 

 2011            BESSY OVIEDO DO                         112.1          
  Candidiasis Of Vulva And Vagina                     

 

 2011            DIDI OVIEDO DOA K                         305.1          
  NONDEPENDENT TOBACCO USE DISORDER                     

 

 2011            DIDI OVIEDO DOA K                         727.51         
   SYNOVIAL CYST OF POPLITEAL SPACE                     

 

 2011            BESSY OVIEDO DO K                         112.1          
  Candidiasis Of Vulva And Vagina                     

 

 2011            DIDI OVIEDO DOA K                         305.1          
  NONDEPENDENT TOBACCO USE DISORDER                     

 

 2011            DIDI OVIEDO DOA K                         727.51         
   SYNOVIAL CYST OF POPLITEAL SPACE                     

 

 2011                                      719.47            Pain In 
Joint Involving Ankle And Foot                     

 

 2011                                      719.47            Pain In 
Joint Involving Ankle And Foot                     

 

 2011            BESSY OVIEDO DO                         719.47         
   Pain In Joint Involving Ankle And Foot                     

 

 2011                                      719.47            Pain In 
Joint Involving Ankle And Foot                     

 

 2011            LIZBETH ORTIZ N                         
719.47            Pain In Joint Involving Ankle And Foot                     

 

 2011            BESSY OVIEDO DO                         719.47         
   Pain In Joint Involving Ankle And Foot                     

 

 2011            BESSY OVIEDO DO                         719.47         
   Pain In Joint Involving Ankle And Foot                     

 

 2011            BESSY OVIEDO DO                         719.47         
   Pain In Joint Involving Ankle And Foot                     

 

 2011                                      110.1            ONYCHOMYCOSIS
                     

 

 2011                                      726.79            BURSITIS 
FOOT                     

 

 2011                                      110.1            ONYCHOMYCOSIS
                     

 

 2011                                      726.79            BURSITIS 
FOOT                     

 

 2011            OVIEDO DO, BESSY K                         110.1          
  ONYCHOMYCOSIS                     

 

 2011            OVIEDO DO, BESSY K                         726.79         
   BURSITIS FOOT                     

 

 2011                                      110.1            ONYCHOMYCOSIS
                     

 

 2011                                      726.79            BURSITIS 
FOOT                     

 

 2011            LIZBETH ORTIZ N                         
110.1            ONYCHOMYCOSIS                     

 

 2011            SAMARA ORTIZCY N                         
726.79            BURSITIS FOOT                     

 

 2011            OVIEDO DO, BESSY K                         110.1          
  ONYCHOMYCOSIS                     

 

 2011            OVIEDO DO, BESSY K                         726.79         
   BURSITIS FOOT                     

 

 2011            OVIEDO DO, BESSY K                         110.1          
  ONYCHOMYCOSIS                     

 

 2011            OVIEDO DO, BESSY K                         726.79         
   BURSITIS FOOT                     

 

 2011            OVIEDO DO, BESSY K                         110.1          
  ONYCHOMYCOSIS                     

 

 2011            OVIEDO DO, BESSY K                         726.79         
   BURSITIS FOOT                     

 

 2011                                      V58.69            MEDICATION 
HIGH RISK                     

 

 2011                                      V58.69            MEDICATION 
HIGH RISK                     

 

 2011            OVIEDO DO, BESYS K                         V58.69         
   MEDICATION HIGH RISK                     

 

 2011                                      V58.69            MEDICATION 
HIGH RISK                     

 

 2011            SAMARA ORTIZCY N                         
V58.69            MEDICATION HIGH RISK                     

 

 2011            OVIEDO DO, BESSY K                         V58.69         
   MEDICATION HIGH RISK                     

 

 2011            OVIEDO DO, BESSY K                         V58.69         
   MEDICATION HIGH RISK                     

 

 2011            OVIEDO DO, BESSY K                         V58.69         
   MEDICATION HIGH RISK                     

 

 2012                                      309.0            ADJUSTMENT 
DISORDER WITH DEPRESSED MOOD                     

 

 2012                                      309.0            ADJUSTMENT 
DISORDER WITH DEPRESSED MOOD                     

 

 2012            IVELISSE DODIDIA K                         309.0          
  ADJUSTMENT DISORDER WITH DEPRESSED MOOD                     

 

 2012                                      309.0            ADJUSTMENT 
DISORDER WITH DEPRESSED MOOD                     

 

 2012            LIZBETH ORTIZ N                         
309.0            ADJUSTMENT DISORDER WITH DEPRESSED MOOD                     

 

 2012            OVIEDO DO BESSY K                         309.0          
  ADJUSTMENT DISORDER WITH DEPRESSED MOOD                     

 

 2012            OVIEDO DO, BESSY K                         309.0          
  ADJUSTMENT DISORDER WITH DEPRESSED MOOD                     

 

 2012            BESSY OVIEDO DO                         309.0          
  ADJUSTMENT DISORDER WITH DEPRESSED MOOD                     

 

 2012                                      627.8            OTHER 
SPECIFIED MENOPAUSAL AND POSTMENOPAUSAL DISORDERS                     

 

 2012                                      627.8            OTHER 
SPECIFIED MENOPAUSAL AND POSTMENOPAUSAL DISORDERS                     

 

 2012            BESSY OVIEDO DO                         627.8          
  OTHER SPECIFIED MENOPAUSAL AND POSTMENOPAUSAL DISORDERS                     

 

 2012                                      627.8            OTHER 
SPECIFIED MENOPAUSAL AND POSTMENOPAUSAL DISORDERS                     

 

 2012            LIZBETH ORTIZ N                         
627.8            OTHER SPECIFIED MENOPAUSAL AND POSTMENOPAUSAL DISORDERS       
              

 

 2012            BESSY OVIEDO DO K                         627.8          
  OTHER SPECIFIED MENOPAUSAL AND POSTMENOPAUSAL DISORDERS                     

 

 2012            BESSY OVIEDO DO K                         627.8          
  OTHER SPECIFIED MENOPAUSAL AND POSTMENOPAUSAL DISORDERS                     

 

 2012            BESSY OVIEDO DO                         627.8          
  OTHER SPECIFIED MENOPAUSAL AND POSTMENOPAUSAL DISORDERS                     

 

 2012                                      780.52            INSOMNIA 
UNSPECIFIED                     

 

 2012                                      780.52            INSOMNIA 
UNSPECIFIED                     

 

 2012            BESSY OVIEDO DO                         780.52         
   INSOMNIA UNSPECIFIED                     

 

 2012                                      780.52            INSOMNIA 
UNSPECIFIED                     

 

 2012            LIZBETH ORTIZ N                         
780.52            INSOMNIA UNSPECIFIED                     

 

 2012            BESSY OVIEDO DO K                         780.52         
   INSOMNIA UNSPECIFIED                     

 

 2012            BESSY OVIEDO DO K                         780.52         
   INSOMNIA UNSPECIFIED                     

 

 2012            BESSY OVIEDO DO K                         780.52         
   INSOMNIA UNSPECIFIED                     

 

 2012                         Ot            297.9            PARANOID 
STATE NOS                     

 

 2012                         Ot            305.1            TOBACCO USE 
DISORDER                     

 

 2012                         Ot            780.09            OTHER 
ALTERATION OF CONSCIOUSNESS                     

 

 2012                         Ot            969.4            POIS-
BENZODIAZEPINE STAPLETON                     

 

 2012                         Ot            E980.3            UNDETERM 
POIS-PSYCHOTROP                     

 

 10/17/2012                         Ot            298.9            PSYCHOSIS 
NOS                     

 

 2013                                      723.1            CERVICALGIA  
                   

 

 2013                                      V76.19            OTHER 
SCREENING BREAST EXAMINATION                     

 

 2013            LIZBETH ORTIZ N                         
723.1            CERVICALGIA                     

 

 2013            LIZBETH ORTIZ N                         
V76.19            OTHER SCREENING BREAST EXAMINATION                     

 

 2013            BESSY OVIEDO DO                         723.1          
  CERVICALGIA                     

 

 2013            BESSY OVIEDO DO                         V76.19         
   OTHER SCREENING BREAST EXAMINATION                     

 

 2013            OVIEDO DO, BESSY K                         723.1          
  CERVICALGIA                     

 

 2013            OVIEDO DO, BESSY K                         V76.19         
   OTHER SCREENING BREAST EXAMINATION                     

 

 2013            OVIEDO DO, BESSY K                         723.1          
  CERVICALGIA                     

 

 2013            OVIEDO DO, BESSY K                         V76.19         
   OTHER SCREENING BREAST EXAMINATION                     

 

 2013            LIZBETH ORTIZ N                         
723.8            OTHER SYNDROMES AFFECTING CERVICAL REGION                     

 

 2013            LIZBETH ORTIZ N                         
728.85            SPASM OF MUSCLE                     

 

 2013            OVIEDO DO, BESSY K                         723.8          
  OTHER SYNDROMES AFFECTING CERVICAL REGION                     

 

 2013            OVIEDO DO, BESSY K                         728.85         
   SPASM OF MUSCLE                     

 

 2013            OVIEDO DO, BESSY K                         723.8          
  OTHER SYNDROMES AFFECTING CERVICAL REGION                     

 

 2013            OVIEDO DO, EBSSY K                         728.85         
   SPASM OF MUSCLE                     

 

 2013            OVIEDO DO, BESSY K                         723.8          
  OTHER SYNDROMES AFFECTING CERVICAL REGION                     

 

 2013            OVIEDO DO BESSY K                         728.85         
   SPASM OF MUSCLE                     

 

 10/10/2013            OVIEDO DO, BESSY K                         733.92         
   CHONDROMALACIA                     

 

 10/10/2013            OVIEDO DO, BESSY K                         733.92         
   CHONDROMALACIA                     

 

 10/10/2013            OVIEDO DO, BESSY K                         733.92         
   CHONDROMALACIA                     

 

 2014                         Ot            V76.12                       
           

 

 2014                         Ot            719.06                       
           

 

 2014                         Ot            727.51                       
           

 

 2014                         Ot            V76.12                       
           

 

 2014            DL LANDRY            Ot            793.82 
                                 

 

 2014            DL LANDRY ARN            Ot            V76.12 
                                 

 

 2014            DL LANDRY ARNP            Ot            793.80 
                                 

 

 2014            LIZBETH POTTS ARNP            Ot            
728.85                                  

 

 2014            DL LANDRY            Ot            793.80 
                                 

 

 2014                         Ot            V76.12                       
           

 

 2014                         Ot            719.06                       
           

 

 2014                         Ot            727.51                       
           

 

 2014                         Ot            V76.12                       
           

 

 2014            DL LANDRY            Ot            793.82 
                                 

 

 2014            DL LANDRY            Ot            V76.12 
                                 

 

 2014            DL LANDRYP            Ot            793.80 
                                 

 

 2014            LIZBETH POTTSP            Ot            
728.85                                  

 

 2014            DL LANDRYP            Ot            793.80 
                                 

 

 2014                         Ot            V76.12                       
           

 

 2014                         Ot            719.06                       
           

 

 2014                         Ot            727.51                       
           

 

 2014                         Ot            V76.12                       
           

 

 2014            DL LANDRYP            Ot            793.82 
                                 

 

 2014            DL LANDRY ARNP            Ot            V76.12 
                                 

 

 2014            DL LANDRY ARNP            Ot            793.80 
                                 

 

 2014            LIZBETH POTTS ARNP            Ot            
728.85                                  

 

 2014            DL LANDRY ARNP            Ot            793.80 
                                 

 

 2014            DL LANDRY ARNP            Ot            793.80 
                                 

 

 2016                         Ot            V76.12            OTH SCREEN 
MAMMO-MALIGN NEOPLASM OF MARITZA                     

 

 2016            DL LANDRY ARNP            Ot            793.82 
           INCONCLUSIVE MAMMOGRAM                     

 

 2016            DL LANDRY ARNP            Ot            V76.12 
           OTH SCREEN MAMMO-MALIGN NEOPLASM OF MARITZA                     

 

 2016            DL LANDRY ARNP            Ot            793.80 
           UNSPEC ABNORMAL MAMMOGRAM                     

 

 2016            LIZBETH POTTS ARNP            Ot            
728.85            SPASM OF MUSCLE                     

 

 2016            DL LANDRY ARNP            Ot            793.80 
           UNSPEC ABNORMAL MAMMOGRAM                     

 

 2016            DL LANDRY ARNP            Ot            793.80 
           UNSPEC ABNORMAL MAMMOGRAM                     

 

 2016                         Ot            V76.12            OTH SCREEN 
MAMMO-MALIGN NEOPLASM OF MARITZA                     

 

 2016            DL LANDRY ARNP            Ot            793.82 
           INCONCLUSIVE MAMMOGRAM                     

 

 2016            DL LANDRY ARNP            Ot            V76.12 
           OTH SCREEN MAMMO-MALIGN NEOPLASM OF MARITZA                     

 

 2016            DL LANDRY ARNP            Ot            793.80 
           UNSPEC ABNORMAL MAMMOGRAM                     

 

 2016            LIZBETH POTTS ARNP            Ot            
728.85            SPASM OF MUSCLE                     

 

 2016            DL LANDRY ARNP            Ot            793.80 
           UNSPEC ABNORMAL MAMMOGRAM                     

 

 2016            DL LANDRY ARNP            Ot            793.80 
           UNSPEC ABNORMAL MAMMOGRAM                     

 

 12/15/2016                         Ot            V76.12            OTH SCREEN 
MAMMO-MALIGN NEOPLASM OF MARITZA                     

 

 12/15/2016            DL LANDRY ARNP            Ot            793.82 
           INCONCLUSIVE MAMMOGRAM                     

 

 12/15/2016            DL LANDRY ARNP            Ot            V76.12 
           OTH SCREEN MAMMO-MALIGN NEOPLASM OF MARITZA                     

 

 12/15/2016            DL LANDRY ARNP            Ot            793.80 
           UNSPEC ABNORMAL MAMMOGRAM                     

 

 12/15/2016            LIZBETH POTTS N ARNP            Ot            
728.85            SPASM OF MUSCLE                     

 

 12/15/2016            DL LANDRY ARNP            Ot            793.80 
           UNSPEC ABNORMAL MAMMOGRAM                     

 

 12/15/2016            DL LANDRY ARNP            Ot            793.80 
           UNSPEC ABNORMAL MAMMOGRAM                     

 

 12/15/2016            BRIELLE SERRATO ARNP            Ot            M48.06    
        SPINAL STENOSIS, LUMBAR REGION                     

 

 12/15/2016            BRIELLE SERRATO ARNP            Ot            Z12.31    
        ENCNTR SCREEN MAMMOGRAM FOR MALIGNANT NE                     

 

 2016            ROJELIOBRIELLE CORMIER ARNP            Ot            M48.06    
        SPINAL STENOSIS, LUMBAR REGION                     

 

 2016                         Ot            V76.12            OTH SCREEN 
MAMMO-MALIGN NEOPLASM OF MARIT[place]                     

 

 2016            DL LANDRY ARNP            Ot            793.82 
           INCONCLUSIVE MAMMOGRAM                     

 

 2016            DL LANDRY ARNP            Ot            V76.12 
           OTH SCREEN MAMMO-MALIGN NEOPLASM OF MARITZA                     

 

 2016            DL LANDRY ARNP            Ot            793.80 
           UNSPEC ABNORMAL MAMMOGRAM                     

 

 2016            LIZBETH POTTS ARNP            Ot            
728.85            SPASM OF MUSCLE                     

 

 2016            DL LANDRY ARNP            Ot            793.80 
           UNSPEC ABNORMAL MAMMOGRAM                     

 

 2016            DL LANDRY ARNP            Ot            793.80 
           UNSPEC ABNORMAL MAMMOGRAM                     

 

 2016            BRIELLE SERRATO ARNP            Ot            M48.06    
        SPINAL STENOSIS, LUMBAR REGION                     

 

 2016            BRIELLE SERRATO ARNP            Ot            Z12.31    
        ENCNTR SCREEN MAMMOGRAM FOR MALIGNANT NE                     

 

 2016            BRIELLE SERRATO ARNP            Ot            Z12.31    
        ENCNTR SCREEN MAMMOGRAM FOR MALIGNANT NE                     

 

 2017                         Ot            V76.12            OTH SCREEN 
MAMMO-MALIGN NEOPLASM OF MARITZA                     

 

 2017            DL LANDRY ARNP            Ot            793.82 
           INCONCLUSIVE MAMMOGRAM                     

 

 2017            DL LANDRY ARNP            Ot            V76.12 
           OTH SCREEN MAMMO-MALIGN NEOPLASM OF MARITZA                     

 

 2017            DL LANDRY ARNP            Ot            793.80 
           UNSPEC ABNORMAL MAMMOGRAM                     

 

 2017            LIZBETH POTTS ARNP            Ot            
728.85            SPASM OF MUSCLE                     

 

 2017            DL LANDRY ARNP            Ot            793.80 
           UNSPEC ABNORMAL MAMMOGRAM                     

 

 2017            DL LANDRY ARNP            Ot            793.80 
           UNSPEC ABNORMAL MAMMOGRAM                     

 

 2017            BRIELLE SERRATO ARNP            Ot            M54.41    
        LUMBAGO WITH SCIATICA, RIGHT SIDE                     

 

 2017            BRIELLE SERRATO ARNP            Ot            M48.06    
        SPINAL STENOSIS, LUMBAR REGION                     

 

 2017            BRIELLE SERRATOP            Ot            Z12.31    
        ENCNTR SCREEN MAMMOGRAM FOR MALIGNANT NE                     

 

 2017            ALIDA CABRAL, AIDAN MCKEON            Ot            M43.16      
      SPONDYLOLISTHESIS, LUMBAR REGION                     

 

 2017            AIDAN IRWIN MD            Ot            M51.16      
      INTERVERTEBRAL DISC DISORDERS W RADICULO                     

 

 2017            AIDAN IRWIN MD            Ot            Z79.899     
       OTHER LONG TERM (CURRENT) DRUG THERAPY                     

 

 2017                         Ot            V76.12            OTH SCREEN 
MAMMO-MALIGN NEOPLASM OF MARIT[place]                     

 

 2017            DL LANDRY ARNP            Ot            793.82 
           INCONCLUSIVE MAMMOGRAM                     

 

 2017            DL LANDRY ARNP            Ot            V76.12 
           OTH SCREEN MAMMO-MALIGN NEOPLASM OF MARITZA                     

 

 2017            DL LANDRY ARNP            Ot            793.80 
           UNSPEC ABNORMAL MAMMOGRAM                     

 

 2017            LIZBETH POTTS ARNP            Ot            
728.85            SPASM OF MUSCLE                     

 

 2017            DL LANDRY ARNP            Ot            793.80 
           UNSPEC ABNORMAL MAMMOGRAM                     

 

 2017            DL LANDRY ARNP            Ot            793.80 
           UNSPEC ABNORMAL MAMMOGRAM                     

 

 2017            ROJELIO BRIELLE MEDERSOP            Ot            M54.41    
        LUMBAGO WITH SCIATICA, RIGHT SIDE                     

 

 2017            BRIELLE SERRATOP            Ot            M48.06    
        SPINAL STENOSIS, LUMBAR REGION                     

 

 2017            BRIELLE SERRATOP            Ot            Z12.31    
        ENCNTR SCREEN MAMMOGRAM FOR MALIGNANT NE                     

 

 2017            BRIELLE SERRATOP            Ot            M54.41    
        LUMBAGO WITH SCIATICA, RIGHT SIDE                     

 

 2017            AIDAN IRWIN MD            Ot            M43.16      
      SPONDYLOLISTHESIS, LUMBAR REGION                     

 

 2017            AIDAN IRWIN MD            Ot            M51.16      
      INTERVERTEBRAL DISC DISORDERS W RADICULO                     

 

 2017            AIDAN IRWIN MD            Ot            Z79.899     
       OTHER LONG TERM (CURRENT) DRUG THERAPY                     

 

 2017            AIDAN IRWIN MD            Ot            M43.16      
      SPONDYLOLISTHESIS, LUMBAR REGION                     

 

 2017            AIDAN IRWIN MD            Ot            M51.16      
      INTERVERTEBRAL DISC DISORDERS W RADICULO                     

 

 2017            AIDAN IRWIN MD            Ot            Z79.899     
       OTHER LONG TERM (CURRENT) DRUG THERAPY                     

 

 2017                         Ot            V76.12            OTH SCREEN 
MAMMO-MALIGN NEOPLASM OF MARIT[place]                     

 

 2017            DL LANDRY ARNP            Ot            793.82 
           INCONCLUSIVE MAMMOGRAM                     

 

 2017            DL LANDRYP            Ot            V76.12 
           OTH SCREEN MAMMO-MALIGN NEOPLASM OF MARITZA                     

 

 2017            DL LANDRY ARNP            Ot            793.80 
           UNSPEC ABNORMAL MAMMOGRAM                     

 

 2017            LIZBETH POTTS ARNP            Ot            
728.85            SPASM OF MUSCLE                     

 

 2017            DL LANDRY ARNP            Ot            793.80 
           UNSPEC ABNORMAL MAMMOGRAM                     

 

 2017            DL LANDRY ARNP            Ot            793.80 
           UNSPEC ABNORMAL MAMMOGRAM                     

 

 2017            BRIELLE SERRATO ARNP            Ot            M48.06    
        SPINAL STENOSIS, LUMBAR REGION                     

 

 2017            BRIELLE SERRATOP            Ot            Z12.31    
        ENCNTR SCREEN MAMMOGRAM FOR MALIGNANT NE                     

 

 2017                         Ot            V76.12            OTH SCREEN 
MAMMO-MALIGN NEOPLASM OF [place]                     

 

 2017            DL LANDRY ARNP            Ot            793.82 
           INCONCLUSIVE MAMMOGRAM                     

 

 2017            DL LANDRY            Ot            V76.12 
           OTH SCREEN MAMMO-MALIGN NEOPLASM OF MARITZA                     

 

 2017            DL LANDRY ARNP            Ot            793.80 
           UNSPEC ABNORMAL MAMMOGRAM                     

 

 2017            KATLYN LIZBETH ALFA ARNP            Ot            
728.85            SPASM OF MUSCLE                     

 

 2017            DL LANDRY ARNP            Ot            793.80 
           UNSPEC ABNORMAL MAMMOGRAM                     

 

 2017            DL LANDRY ARNP            Ot            793.80 
           UNSPEC ABNORMAL MAMMOGRAM                     

 

 2017            BRIELLE SERRATO ARNP            Ot            M48.06    
        SPINAL STENOSIS, LUMBAR REGION                     

 

 2017            BRIELLE SERRATOP            Ot            Z12.31    
        ENCNTR SCREEN MAMMOGRAM FOR MALIGNANT NE                     

 

 2017            AIDAN IRWIN MD            Ot            M43.16      
      SPONDYLOLISTHESIS, LUMBAR REGION                     

 

 2017            AIDAN IRWIN MD            Ot            M51.16      
      INTERVERTEBRAL DISC DISORDERS W RADICULO                     

 

 2017            AIDAN IRWIN MD            Ot            Z79.899     
       OTHER LONG TERM (CURRENT) DRUG THERAPY                     

 

 2017                         Ot            V76.12                       
           

 

 2017                         Ot            611.72                       
           

 

 2017                         Ot            611.72                       
           

 

 2017                         Ot            780.79                       
           

 

 2017                         Ot            V16.3                        
          

 

 2017                         Ot            V72.83                       
           

 

 2017                         Ot            V74.8                        
          

 

 2017                         Ot            V76.12                       
           

 

 2017                         Ot            V76.12            OTH SCREEN 
MAMMO-MALIGN NEOPLASM OF MARITZA                     

 

 2017                         Ot            719.06            JOINT 
EFFUSION-L/LEG                     

 

 2017                         Ot            727.51            POPLITEAL 
SYNOVIAL CYST                     

 

 2017                         Ot            V76.12            OTH SCREEN 
MAMMO-MALIGN NEOPLASM OF [place]                     

 

 2017            DL LANDRY ARNP            Ot            793.82 
           INCONCLUSIVE MAMMOGRAM                     

 

 2017            DL LANDRYP            Ot            V76.12 
           OTH SCREEN MAMMO-MALIGN NEOPLASM OF MAR[place]                     

 

 2017            DL LANDRY ARNP            Ot            793.80 
           UNSPEC ABNORMAL MAMMOGRAM                     

 

 2017            LIZBETH POTTS ARNP            Ot            
728.85            SPASM OF MUSCLE                     

 

 2017            DL LANDRY ARNP            Ot            793.80 
           UNSPEC ABNORMAL MAMMOGRAM                     

 

 2017            DL LANDRY ARNP            Ot            793.80 
           UNSPEC ABNORMAL MAMMOGRAM                     

 

 2017            BRIELLE SERRATOP            Ot            M48.06    
        SPINAL STENOSIS, LUMBAR REGION                     

 

 2017            BRIELLE SERRATOP            Ot            Z12.31    
        ENCNTR SCREEN MAMMOGRAM FOR MALIGNANT NE                     

 

 10/13/2017            DL LANDRY ARNP            Ot            793.82 
           INCONCLUSIVE MAMMOGRAM                     

 

 10/13/2017            DL LANDRY ARNP            Ot            V76.12 
           OTH SCREEN MAMMO-MALIGN NEOPLASM OF MARITZA                     

 

 10/13/2017            DL LANDRY ARNP            Ot            793.80 
           UNSPEC ABNORMAL MAMMOGRAM                     

 

 10/13/2017            LIZBETH POTTS ARNP            Ot            
728.85            SPASM OF MUSCLE                     

 

 10/13/2017            DL LANDRY ARNP            Ot            793.80 
           UNSPEC ABNORMAL MAMMOGRAM                     

 

 10/13/2017            DL LANDRY ARNP            Ot            793.80 
           UNSPEC ABNORMAL MAMMOGRAM                     

 

 10/13/2017            BRIELLE SERRATOP            Ot            M48.06    
        SPINAL STENOSIS, LUMBAR REGION                     

 

 10/13/2017            BRIELLE SERRATO            Ot            Z12.31    
        ENCNTR SCREEN MAMMOGRAM FOR MALIGNANT NE                     

 

 10/18/2017            GUILLERMO CABRAL, ROSALINO CADET            Ot            
F17.210            NICOTINE DEPENDENCE, CIGARETTES, UNCOMPL                     

 

 10/18/2017            GUILLERMO CABRAL, ROSALINO CADET            Ot            N39.0 
           URINARY TRACT INFECTION, SITE NOT SPECIF                     

 

 10/18/2017            GUILLERMO CABRAL, ROSALINO CADET            Ot            R11.10
            VOMITING, UNSPECIFIED                     

 

 10/26/2017            NAHID FONTENOT            Ot            F07.81   
         POSTCONCUSSIONAL SYNDROME                     



                                                                               
                                                                               
                                                                               
                                                                               
                                                                               
                                                                               
                                                                               
                                                                               
                                                                               
                                                                               
                                                                               
                                                                               
                                                                               
                       



Procedures

      





 Code            Description            Performed By            Performed On   
     

 

             EDWARD BOWEN                
                   2013        

 

             [id number]                                  ROUTINE VENIPUNCTURE       
                            2013        

 

             [id number]                                  URIC ACID                  
                 2013        

 

             73507                                  MAMMOGRAM, SCREENING       
                            2013        

 

             13767                                  MAMMOGRAM DX, LEFT         
                          2013        

 

             77089                                  US BREAST ULTRASOUND, LEFT 
                                  2013        

 

             14494                                  CULTURE STOOL              
                     10/08/2013        

 

             20183                                  STOOL FOR O & P            
                       10/08/2013        

 

             79965                                  CLOSTRIDIUM (C-DIFF)       
                            10/09/2013        

 

             39596                                  JOINT INJECTION- LARGE 
JOINT (SPECIFY MEDCIN DESCRIPTION)                                   10/10/2013
        

 

             86743                                  JOINT INJECTION- LARGE 
JOINT (SPECIFY MEDCIN DESCRIPTION)                                   2014
        

 

             26340                                  XRAY KNEE RIGHT 1 OR 2 
VIEWS                                   2014                                  JOINT INJECTION- LARGE 
JOINT (SPECIFY MEDCIN DESCRIPTION)                                   2014
        



                                          



Results

      





 Test            Result            Range        









 CBC With Differential/Platelet - 10/20/16 13:26         









 WBC            8.6 x10E3/uL            3.4-10.8        

 

 RBC            4.60 x10E6/uL            3.77-5.28        

 

 Hemoglobin            14.2 g/dL            11.1-15.9        

 

 Hematocrit            42.2 %            34.0-46.6        

 

 MCV            92 fL            79-97        

 

 MCH            30.9 pg            26.6-33.0        

 

 MCHC            33.6 g/dL            31.5-35.7        

 

 RDW            12.9 %            12.3-15.4        

 

 Platelets            426 x10E3/uL            150-379        

 

 Neutrophils            44 %                     

 

 Lymphs            44 %                     

 

 Monocytes            9 %                     

 

 Eos            2 %                     

 

 Basos            1 %                     

 

 Neutrophils (Absolute)            3.8 x10E3/uL            1.4-7.0        

 

 Lymphs (Absolute)            3.8 x10E3/uL            0.7-3.1        

 

 Monocytes(Absolute)            0.7 x10E3/uL            0.1-0.9        

 

 Eos (Absolute)            0.2 x10E3/uL            0.0-0.4        

 

 Baso (Absolute)            0.1 x10E3/uL            0.0-0.2        

 

 Immature Granulocytes            0 %                     

 

 Immature Grans (Abs)            0.0 x10E3/uL            0.0-0.1        

 

 Hematology Comments:            Note:                      









 Comp. Metabolic Panel (14) - 10/20/16 13:26         









 Glucose, Serum            84 mg/dL            65-99        

 

 BUN            12 mg/dL            6-24        

 

 Creatinine, Serum            0.67 mg/dL            0.57-1.00        

 

 eGFR If NonAfricn Am            104 mL/min/1.73                >59        

 

 eGFR If Africn Am            119 mL/min/1.73                >59        

 

 BUN/Creatinine Ratio            18             9-23        

 

 Sodium, Serum            142 mmol/L            136-144        

 

 Potassium, Serum            4.1 mmol/L            3.5-5.2        

 

 Chloride, Serum            99 mmol/L                    

 

 Carbon Dioxide, Total            26 mmol/L            18-29        

 

 Calcium, Serum            9.1 mg/dL            8.7-10.2        

 

 Protein, Total, Serum            7.1 g/dL            6.0-8.5        

 

 Albumin, Serum            4.4 g/dL            3.5-5.5        

 

 Globulin, Total            2.7 g/dL            1.5-4.5        

 

 A/G Ratio            1.6             1.1-2.5        

 

 Bilirubin, Total            0.2 mg/dL            0.0-1.2        

 

 Alkaline Phosphatase, S            64 IU/L                    

 

 AST (SGOT)            13 IU/L            0-40        

 

 ALT (SGPT)            11 IU/L            0-32        









 TSH - 10/20/16 13:26         









 TSH            2.000 uIU/mL            0.450-4.500        









 C-Reactive Protein, Quant - 10/20/16 13:26         









 C-Reactive Protein, Quant            <0.3 mg/L            0.0-4.9        









 Genital Culture, Routine - 16 16:26         









 Genital Culture, Routine            Note                      









 Pap Lb, HPV-hr - 16 16:26         









 HPV, high-risk            Negative             Negative        

 

 DIAGNOSIS:            Comment                      

 

 Specimen adequacy:            Comment                      

 

 Clinician provided ICD10:            Comment                      

 

 Performed by:            Comment                      

 

 .            .                      

 

 Pathologist provided ICD10:            Comment                      

 

 Note:            Comment                      









 Complete blood count (CBC) with automated white blood cell (WBC) differential 
- 10/18/17 17:25         









 Blood leukocytes automated count (number/volume)            9.0 10*3/uL       
     4.3-11.0        

 

 Blood erythrocytes automated count (number/volume)            4.31 10*6/uL    
        4.35-5.85        

 

 Venous blood hemoglobin measurement (mass/volume)            12.8 g/dL        
    11.5-16.0        

 

 Blood hematocrit (volume fraction)            39 %            35-52        

 

 Automated erythrocyte mean corpuscular volume            91 [foz_us]          
  80-99        

 

 Automated erythrocyte mean corpuscular hemoglobin (mass per erythrocyte)      
      30 pg            25-34        

 

 Automated erythrocyte mean corpuscular hemoglobin concentration measurement (
mass/volume)            33 g/dL            32-36        

 

 Automated erythrocyte distribution width ratio            13.4 %            
10.0-14.5        

 

 Automated blood platelet count (count/volume)            357 10*3/uL          
  130-400        

 

 Automated blood platelet mean volume measurement            9.7 [foz_us]      
      7.4-10.4        

 

 Automated blood neutrophils/100 leukocytes            86 %            42-75   
     

 

 Automated blood lymphocytes/100 leukocytes            12 %            12-44   
     

 

 Blood monocytes/100 leukocytes            2 %            0-12        

 

 Automated blood eosinophils/100 leukocytes            0 %            0-10     
   

 

 Automated blood basophils/100 leukocytes            0 %            0-10        

 

 Blood neutrophils automated count (number/volume)            7.8 10*3         
   1.8-7.8        

 

 Blood lymphocytes automated count (number/volume)            1.1 10*3         
   1.0-4.0        

 

 Blood monocytes automated count (number/volume)            0.2 10*3            
0.0-1.0        

 

 Automated eosinophil count            0.0 10*3/uL            0.0-0.3        

 

 Automated blood basophil count (count/volume)            0.0 10*3/uL          
  0.0-0.1        









 Comprehensive metabolic panel - 10/18/17 17:25         









 Serum or plasma sodium measurement (moles/volume)            141 mmol/L       
     135-145        

 

 Serum or plasma potassium measurement (moles/volume)            4.3 mmol/L    
        3.6-5.0        

 

 Serum or plasma chloride measurement (moles/volume)            105 mmol/L     
               

 

 Carbon dioxide            24 mmol/L            21-32        

 

 Serum or plasma anion gap determination (moles/volume)            12 mmol/L   
         5-14        

 

 Serum or plasma urea nitrogen measurement (mass/volume)            12 mg/dL   
         7-18        

 

 Serum or plasma creatinine measurement (mass/volume)            0.79 mg/dL    
        0.60-1.30        

 

 Serum or plasma urea nitrogen/creatinine mass ratio            15             
NRG        

 

 Serum or plasma creatinine measurement with calculation of estimated 
glomerular filtration rate            >             NRG        

 

 Serum or plasma glucose measurement (mass/volume)            143 mg/dL        
            

 

 Serum or plasma calcium measurement (mass/volume)            10.0 mg/dL       
     8.5-10.1        

 

 Serum or plasma total bilirubin measurement (mass/volume)            0.5 mg/dL
            0.1-1.0        

 

 Serum or plasma alkaline phosphatase measurement (enzymatic activity/volume)  
          66 U/L                    

 

 Serum or plasma aspartate aminotransferase measurement (enzymatic activity/
volume)            19 U/L            5-34        

 

 Serum or plasma alanine aminotransferase measurement (enzymatic activity/volume
)            13 U/L            0-55        

 

 Serum or plasma protein measurement (mass/volume)            8.5 g/dL         
   6.4-8.2        

 

 Serum or plasma albumin measurement (mass/volume)            4.5 g/dL         
   3.2-4.5        









 Magnesium - 10/18/17 17:25         









 Magnesium            2.5 mg/dL            1.8-2.4        









 Lipase - 10/18/17 17:25         









 Lipase            22 U/L            8-78        









 Complete urinalysis with reflex to culture - 10/18/17 18:27         









 Urine color determination            YELLOW             NRG        

 

 Urine clarity determination            CLEAR             NRG        

 

 Urine pH measurement by test strip            7             5-9        

 

 Specific gravity of urine by test strip            1.005             1.016-
1.022        

 

 Urine protein assay by test strip, semi-quantitative            NEGATIVE      
       NEGATIVE        

 

 Urine glucose detection by automated test strip            NEGATIVE           
  NEGATIVE        

 

 Erythrocytes detection in urine sediment by light microscopy            
NEGATIVE             NEGATIVE        

 

 Urine ketones detection by automated test strip            NEGATIVE           
  NEGATIVE        

 

 Urine nitrite detection by test strip            NEGATIVE             NEGATIVE
        

 

 Urine total bilirubin detection by test strip            NEGATIVE             
NEGATIVE        

 

 Urine urobilinogen measurement by automated test strip (mass/volume)          
  NORMAL             NORMAL        

 

 Urine leukocyte esterase detection by dipstick            2+             
NEGATIVE        

 

 Automated urine sediment erythrocyte count by microscopy (number/high power 
field)             [HPF]            NRG        

 

 Automated urine sediment leukocyte count by microscopy (number/high power field
)             [HPF]            NRG        

 

 Bacteria detection in urine sediment by light microscopy            TRACE     
        NRG        

 

 Squamous epithelial cells detection in urine sediment by light microscopy     
       1025             NRG        

 

 Crystals detection in urine sediment by light microscopy            NONE      
       NRG        

 

 Casts detection in urine sediment by light microscopy            NONE         
    NRG        

 

 Mucus detection in urine sediment by light microscopy            NEGATIVE     
        NRG        

 

 Complete urinalysis with reflex to culture            YES             NRG     
   









 Bacterial urine culture - 10/18/17 18:27         









 URINE CULTURE RESULTS            <10,000/ML             NRG        









 CULTURE, STOOL - 18 12:58         









 SALMONELLA AND SHIGELLA, CULTURE            SEE NOTE             NRG        









 STOOL (C-DIFF) - 18 12:58         









 CLOSTRIDIUM DIFFICILE TOXIN/GDH W/REFL TO PCR            SEE NOTE             
NRG        



                                          



Encounters

      





 ACCT No.            Visit Date/Time            Discharge            Status    
        Pt. Type            Provider            Facility            Loc./Unit  
          Complaint        

 

 [id number]            2014 14:50:00            2014 23:59:59          
  CLS            Outpatient            BESSY OVIEDO DO                        
                       

 

 [id number]            2014 12:26:00            2014 23:59:59          
  CLS            Outpatient            BESSY OVIEDO DO                        
                       

 

 [id number]            10/10/2013 12:31:00            10/10/2013 23:59:59          
  CLS            Outpatient            BESSY OVIEOD DO                        
                       

 

 [id number]            10/08/2013 10:59:00            10/08/2013 23:59:59          
  CLS            Outpatient            LIZBETH ORTIZ           
                                    

 

 [id number]            2013 15:09:00            2013 23:59:59          
  CLS            Outpatient            BESSY OVIEDO DO                        
                       

 

 [id number]            2013 16:15:00            2013 23:59:59          
  CLS            Outpatient                                                    
        

 

 [id number]            2012 09:38:00            2012 23:59:59            
CLS            Outpatient                                                      
      

 

 [id number]            2013 16:18:00                                      
Document Registration                                                          
  

 

 [id number]            12/15/2016 10:05:00                                   
   Document Registration                                                       
     

 

 [id number]            10/18/2017 16:16:00            10/18/2017 19:50:00    
        DIS            Emergency            GUILLERMO CABRAL, ROSALINO CADET            
[facility]            ER            VOMITING W TRACES OF 
BLOOD        

 

 [id number]            10/13/2017 13:12:00            10/13/2017 23:59:59    
        CLS            Outpatient            NAHID FONTENOT            
[facility]            RAD            POST CONCUSSIVE 
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        

 

 [id number]            2017 07:54:00            2017 08:54:00    
        DIS            Outpatient            AIDAN IRWIN MD            [facility]            CARD            DISC DISORDER        

 

 [id number]            2017 10:30:00            2017 14:50:00    
        DIS            Outpatient            BRIELLE SERRATO            
[facility]            REHAB            BACK PAIN WITH 
SCIATICA        

 

 [id number]            2017 09:25:00            2017 10:02:00    
        DIS            Outpatient            AIDAN IRWIN MD            [facility]            CARD            DISC DISORDER        

 

 [id number]            12/15/2016 11:44:00            12/15/2016 23:59:59    
        CLS            Outpatient            BRIELLE SERRATO            
[facility]            RAD            SCREENING        

 

 [id number]            12/15/2016 11:37:00            12/15/2016 23:59:59    
        CLS            Outpatient            BRIELLE SERRATO            
[facility]            RAD            LUMBAGO        

 

 [id number]            2014 07:39:00            2014 23:59:59    
        CLS            Outpatient            DL LANDRY ARNP            
[facility]            RAD            FOLLOW UP L BREAST/
ABN EVAL L BREAST        

 

 [id number]            2013 07:46:00            2013 23:59:59    
        CLS            Outpatient            DL LANDRY ARNP            
[facility]            RAD            6 MONTH FOLLOW UP   
     

 

 [id number]            2013 08:06:00            2013 23:59:59    
        CLS            Outpatient            CALDWELLMarisabelREBECCAROBER LIZBETH ALFA ARNP     
       [facility]            RAD            NECK SPASMS  
      

 

 [id number]            2013 14:20:00            2013 23:59:59    
        CLS            Outpatient            DL LANDRY ARNP            
[facility]            RAD            ABN MAMMOGRAM        

 

 [id number]            2013 10:49:00            2013 23:59:59    
        CLS            Outpatient            DL LANDRY ARNP            
[facility]            RAD            SCREENING        

 

 [id number]            2017 16:47:00                                   
   Document Registration                                                       
     

 

 [id number]            2017 16:47:00                                   
   Document Registration                                                       
     

 

 [id number]            2017 16:47:00                                   
   Document Registration                                                       
     

 

 [id number]            10/17/2012 07:40:00                                   
   Document Registration                                                       
     

 

 [id number]            2012 15:45:00                                   
   Document Registration                                                       
     

 

 [id number]            2012 09:39:00                                   
   Document Registration                                                       
     

 

 [id number]            2011 12:12:00                                   
   Document Registration                                                       
     

 

 [id number]            2010 09:11:00                                   
   Document Registration                                                       
     

 

 [id number]            2009 16:18:00                                   
   Document Registration                                                       
     

 

 [id number]            2008 11:40:00                                   
   Document Registration                                                       
     

 

 [id number]            2008 13:27:00                                   
   Document Registration                                                       
     

 

 [id number]            04/15/2008 10:17:00                                   
   Document Registration                                                       
     

 

 [id number]            2014 08:25:07                         [place]            
Document Registration                                                          
  

 

 [id number]            10/21/2016 13:06:00                                   
   Document Registration                                                       
     

 

 [id number]            12/15/2016 13:06:00                                   
   Document Registration                                                       
     

 

 [id number]            2018 10:40:00            2018 23:59:59            
CLS            Outpatient            BRIELLE MAGAÑA                    
     [facility]                     

 

 [id number]            2018 10:40:00                                      
Document Registration
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
## 2018-12-30 NOTE — ED INTEGUMENTARY GENERAL
General


Chief Complaint:  Bite-Animal/Human/Insect


Stated Complaint:  DOG BITE TO FACE


Source:  patient


Exam Limitations:  no limitations





History of Present Illness


Date Seen by Provider:  Dec 30, 2018


Time Seen by Provider:  13:23


Initial Comments


To ER with a dog bite to the left side of the face. This occurred just prior to 

arrival. Her own tetanus is not up-to-date. She was staying with another 

individual last night and had just met this dog last night which was a small 

indoor dog Armen Cook Terrier. It had been nice all along until today at 

which point it bit her in the face. She states that the dog's owner told her 

that it was up-to-date on rabies vaccines she states she was also told to leave 

the house and never come back. She does not wish to file a police report.


Timing/Duration:  just prior to arrival


Severity:  mild


Location:  face


Associated Symptoms:  denies symptoms





Allergies and Home Medications


Allergies


Coded Allergies:  


     No Allergy Information Available (Unverified , 9/23/12)





Home Medications


Citalopram Hydrobromide 10 Mg Tablet, 40 MG PO DAILY, (Reported)


Omeprazole 20 Mg Tablet.dr, 20 MG PO DAILY


   Prescribed by: ROSALINO NUÑEZ on 10/18/17 1923


Sulfamethoxazole/Trimethoprim 1 Each Tablet, 1 EACH PO BID


   Prescribed by: ROSALINO NUÑEZ on 10/18/17 1923


Trazodone Hcl 150 Mg Tablet, 150 MG PO HS, (Reported)





Patient Home Medication List


Home Medication List Reviewed:  Yes





Review of Systems


Review of Systems


Constitutional:  see HPI


EENTM:  see HPI


Respiratory:  no symptoms reported


Cardiovascular:  no symptoms reported


Genitourinary:  no symptoms reported


Musculoskeletal:  no symptoms reported


Skin:  see HPI





Past Medical-Social-Family Hx


Patient Social History


Alcohol Use:  Denies Use


Recreational Drug Use:  No


Type Used:  Cigarettes


2nd Hand Smoke Exposure:  Yes


Recent Foreign Travel:  No


Contact w/Someone Who Travel:  No


Recent Hopitalizations:  No


Physical Abuse:  No


Sexual Abuse:  No





Seasonal Allergies


Seasonal Allergies:  No





Past Medical History


Surgeries:  Yes


Orthopedic


Respiratory:  No


Cardiac:  No


Neurological:  No


Reproductive Disorders:  No


Gastrointestinal:  No


Endocrine:  No


Psychosocial:  Yes


Blood Disorders:  No





Family Medical History


No Pertinent Family Hx





Physical Exam


Vital Signs


Capillary Refill :


General Appearance:  WD/WN, no apparent distress


HEENT:  PERRL/EOMI, normal ENT inspection, other (there is a 1 cm C-shaped 

laceration inferior to the corner of the mouth on the left. Depth is to the 

subcutaneous tissue.)


Neck:  non-tender, full range of motion


Respiratory:  no respiratory distress, no accessory muscle use


Gastrointestinal:  normal bowel sounds, non tender


Extremities:  normal range of motion, non-tender


Neurologic/Psychiatric:  alert, normal mood/affect, oriented x 3


Skin:  normal color, warm/dry





Procedures/Interventions





   Wound Location:  Face


   Wound Length (cm):  1


   Wound's Depth, Shape:  linear


   Wound Explored:  clean


   Irrigated w/ Saline (ccs):  30


   Anesthesia:  1% Lidocaine


   Volume Anesthetic (ccs):  1


   Suture:  Prolene


   Suture Size:  5-0


   Number of Sutures:  3


   Layer Closure?:  1


   Number Deep Layer Sutures:  0


Progress


Anesthetized with 1 mL of lidocaine without epinephrine. Wound then scrubbed 

with chlorhexidine/saline solution. Then closed with 3 simple interrupted 

sutures size 5-0 Prolene.





Progress/Results/Core Measures


Results/Orders


My Orders





Orders - DANIA MYLES Pertuss(Acell),Tet Adult (Boostrix (12/30/18 13:30)


Amoxicillin/Clavulanate Tablet (Augmenti (12/30/18 13:30)


Lidocaine 1% Inj 20 Ml (Xylocaine 1% Inj (12/30/18 13:30)





Medications Given in ED





Current Medications








 Medications  Dose


 Ordered  Sig/Gustavo


 Route  Start Time


 Stop Time Status Last Admin


Dose Admin


 


 Lidocaine HCl  20 ml  STK-MED ONCE


 .ROUTE  12/30/18 13:09


 12/30/18 13:13 DC 12/30/18 13:14


10 ML











Departure


Communication (Admissions)


We'll use Augmentin for antibiotics. I discussed with her these may be 

expensive and if they are too expensive once she gets to the pharmacy she 

should have the pharmacy call here and we can discuss cheaper options. She 

agrees with this. Discussed with her the unlikelihood of this being rabies as 

this is a small indoor dog without likely exposure to rabies and that the dog's 

behavior has been normal until suddenly today when it bit her. For this reason 

I will not recommend rabies vaccination





Impression





 Primary Impression:  


 Dog bite


 Qualified Codes:  W54.0XXA - Bitten by dog, initial encounter


Disposition:  01 HOME, SELF-CARE


Condition:  Stable





Departure-Patient Inst.


Decision time for Depature:  13:29


Referrals:  


[facility] (PCP/Family)


Primary Care Physician


Patient Instructions:  Animal Bites (DC)





Add. Discharge Instructions:  


1. If you wish to make a police report they can obtain the rabies vaccination 

status of this dog may give you more comfort. Alternatively, the dog can be 

watched for 10 days to observe for any sign of rabies. The dog develops any 

signs then you should get rabies vaccination. And/or control/police report can 

help facilitate this. Take antibiotics as directed. If it is too expensive have 

the pharmacy call here and we can call in something cheaper. All discharge 

instructions reviewed with patient and/or family. Voiced understanding.


Scripts


Amoxicillin/Potassium Clav (Augmentin 875-125 Tablet) 1 Each Tablet


1 EACH PO BID, #10 TAB


   Prov: DANIA MYLES         12/30/18











DANIA MYLES Dec 30, 2018 13:30

## 2025-06-04 NOTE — ED GI
General


Chief Complaint:  Abdominal/GI Problems


Stated Complaint:  VOMITING W TRACES OF BLOOD


Nursing Triage Note:  


PT REPORTS THAT SHE HAS BEEN VOMITING X 1 MONTH. SHE HAS BEEN SEEN AND TX BY 

HER 


PCP AND HAD CT SCAN OF HER ABDOMEN YESTERDAY. SHE STATES SHE HAS BEEN TAKING 


ZOFRAN. SHE STATES TODAY WHEN SHE VOMITED THE LAST TIME IT WAS BLOOD TINGED.


Sepsis Screen:  No Definite Risk


Source of Information:  Patient


Exam Limitations:  No Limitations


 (LESLEE HART MD)





History of Present Illness


Time Seen By Provider:  17:00


Initial Comments


Here with report of vomiting times a month.  States that she's been evaluated 

by her doctor and had a CT scan last week.  She said this was of her abdomen 

but appears to be of her head.  The CT of the head was normal per report.  

States that she vomited several times today and reports that there may have 

been a little blood in the last episode of vomiting.  She was given 

prescription of ondansetron 8 mg and last dose at 1230.  She states this is not 

helping her nausea and vomiting.  She still feels like she needs to vomit now 

but has not vomited here.  Denies fever or chills.  Denies breathing problems.  

On entrance into the room, patient was lying down on the bed with her feet up 

on the elevated head of the bed.  Was able to move around and transition to 

normal lying position without difficulty when asked.


Timing/Duration:  1 Week, Constant


Severity/Quality:  Moderate, Other (nausea)


Location:  Generalized Abdomen


Radiation:  No Radiation


Activities at Onset:  None


Modifying Factors:  Worsens With Eating


Associated Symptoms:  Back Pain (chronic), No Chest Pain, No Fever/Chills, 

Nausea/Vomiting, No Shortness of Air, No Swelling/Mass in Abdomen, No Weakness (

LESLEE HART MD)





Allergies and Home Medications


Allergies


Coded Allergies:  


     No Allergy Information Available (Unverified , 9/23/12)





Home Medications


Citalopram Hydrobromide 10 Mg Tablet, 40 MG PO DAILY, (Reported)


Omeprazole 20 Mg Tablet., 20 MG PO DAILY, #30


   Prescribed by: ROSALINO NUÑEZ on 10/18/17 1923


Sulfamethoxazole/Trimethoprim 1 Each Tablet, 1 EACH PO BID, #14


   Prescribed by: ROSALINO NUÑEZ on 10/18/17 1923


Trazodone Hcl 150 Mg Tablet, 150 MG PO HS, (Reported)





Review of Systems


Constitutional:  see HPI, No chills, No fever


EENTM:  No Symptoms Reported


Respiratory:  No Symptoms Reported


Cardiovascular:  No Symptoms Reported, Denies Chest Pain, Denies Edema


Gastrointestinal:  Abdominal Pain (epigastric and generalized nausea), Denies 

Diarrhea, Nausea, Vomiting


Genitourinary:  No Symptoms Reported


Musculoskeletal:  back pain (chronic), No neck pain


Skin:  no symptoms reported


Psychiatric/Neurological:  No Symptoms Reported (LESLEE HART MD)





All Other Systems Reviewed


Negative Unless Noted:  Yes


 (LESLEE HART MD)





Past Medical-Social-Family Hx


Patient Social History


Alcohol Use:  Occasionally Uses


Recreational Drug Use:  Yes


Smoking Status:  Current Everyday Smoker


Type Used:  Cigarettes


2nd Hand Smoke Exposure:  Yes


Recent Foreign Travel:  No


Contact w/Someone Who Travel:  No


Recent Infectious Disease Expo:  No


Recent Hopitalizations:  No


Physical Abuse:  No


Sexual Abuse:  No


 (LESLEE HART MD)





Seasonal Allergies


Seasonal Allergies:  No


 (LESLEE HART MD)





Surgeries


History of Surgeries:  Yes


Surgeries:  Orthopedic


 (LESLEE HART MD)





Respiratory


History of Respiratory Disorde:  No


 (LESLEE HART MD)





Cardiovascular


History of Cardiac Disorders:  No


 (LESLEE HART MD)





Neurological


History of Neurological Disord:  No


 (LESLEE HART MD)





Reproductive System


Hx Reproductive Disorders:  No


 (LESLEE HART MD)





Gastrointestinal


History of Gastrointestinal Di:  No


 (LESLEE HART MD)





Endocrine


History of Endocrine Disorders:  No


 (LESLEE HART MD)





Psychosocial


History of Psychiatric Problem:  Yes


Suicide Risk Score:  0


 (LESLEE HART MD)





Blood Transfusions


History of Blood Disorders:  No


 (LESLEE HART MD)





Reviewed Nursing Assessment


Reviewed/Agree w Nursing PMH:  Yes


 (LESLEE HART MD)





Family Medical History


Significant Family History:  No Pertinent Family Hx


 (LESLEE HART MD)





Physical Exam


Vital Signs





 VS - Last 72 Hours, by Label








 10/18/17





 16:42


 


Temp 98.1


 


Pulse 72


 


Resp 18


 


B/P (MAP) 135/84


 


Pulse Ox 99


 


O2 Delivery Room Air








 (ROSALINO LI MD)


Vital Signs


Capillary Refill : Less Than 3 Seconds 


 (LESLEE HART MD)


General Appearance:  WD/WN, no apparent distress


HEENT:  PERRL/EOMI, pharynx normal


Neck:  full range of motion, supple


Respiratory:  lungs clear, normal breath sounds


Cardiovascular:  regular rate, rhythm, no murmur


Peripheral Pulses:  2+ Dorsalis Pedis (R), 2+ Left Dors-Pedis (L), 2+ Radial 

Pulses (R), 2+ Radial Pulses (L)


Gastrointestinal:  non tender, soft


Extremities:  non-tender, normal inspection


Back:  normal inspection, no CVA tenderness, no vertebral tenderness


Neurologic/Psychiatric:  alert, oriented x 3


Skin:  normal color, warm/dry (LESLEE HART MD)





Progress/Results/Core Measures


Results/Orders


Lab Results





Laboratory Tests








Test


  10/18/17


17:25 10/18/17


18:27 Range/Units


 


 


White Blood Count


  9.0 


  


  4.3-11.0


10^3/uL


 


Red Blood Count


  4.31 L


  


  4.35-5.85


10^6/uL


 


Hemoglobin 12.8   11.5-16.0  G/DL


 


Hematocrit 39   35-52  %


 


Mean Corpuscular Volume 91   80-99  FL


 


Mean Corpuscular Hemoglobin 30   25-34  PG


 


Mean Corpuscular Hemoglobin


Concent 33 


  


  32-36  G/DL


 


 


Red Cell Distribution Width 13.4   10.0-14.5  %


 


Platelet Count


  357 


  


  130-400


10^3/uL


 


Mean Platelet Volume 9.7   7.4-10.4  FL


 


Neutrophils (%) (Auto) 86 H  42-75  %


 


Lymphocytes (%) (Auto) 12   12-44  %


 


Monocytes (%) (Auto) 2   0-12  %


 


Eosinophils (%) (Auto) 0   0-10  %


 


Basophils (%) (Auto) 0   0-10  %


 


Neutrophils # (Auto) 7.8   1.8-7.8  X 10^3


 


Lymphocytes # (Auto) 1.1   1.0-4.0  X 10^3


 


Monocytes # (Auto) 0.2   0.0-1.0  X 10^3


 


Eosinophils # (Auto)


  0.0 


  


  0.0-0.3


10^3/uL


 


Basophils # (Auto)


  0.0 


  


  0.0-0.1


10^3/uL


 


Sodium Level 141   135-145  MMOL/L


 


Potassium Level 4.3   3.6-5.0  MMOL/L


 


Chloride Level 105     MMOL/L


 


Carbon Dioxide Level 24   21-32  MMOL/L


 


Anion Gap 12   5-14  MMOL/L


 


Blood Urea Nitrogen 12   7-18  MG/DL


 


Creatinine


  0.79 


  


  0.60-1.30


MG/DL


 


Estimat Glomerular Filtration


Rate > 60 


  


   


 


 


BUN/Creatinine Ratio 15    


 


Glucose Level 143 H    MG/DL


 


Calcium Level 10.0   8.5-10.1  MG/DL


 


Magnesium Level 2.5 H  1.8-2.4  MG/DL


 


Total Bilirubin 0.5   0.1-1.0  MG/DL


 


Aspartate Amino Transf


(AST/SGOT) 19 


  


  5-34  U/L


 


 


Alanine Aminotransferase


(ALT/SGPT) 13 


  


  0-55  U/L


 


 


Alkaline Phosphatase 66     U/L


 


Total Protein 8.5 H  6.4-8.2  GM/DL


 


Albumin 4.5   3.2-4.5  GM/DL


 


Lipase 22   8-78  U/L


 


Urine Color  YELLOW   


 


Urine Clarity  CLEAR   


 


Urine pH  7  5-9  


 


Urine Specific Gravity  1.005 L 1.016-1.022  


 


Urine Protein  NEGATIVE  NEGATIVE  


 


Urine Glucose (UA)  NEGATIVE  NEGATIVE  


 


Urine Ketones  NEGATIVE  NEGATIVE  


 


Urine Nitrite  NEGATIVE  NEGATIVE  


 


Urine Bilirubin  NEGATIVE  NEGATIVE  


 


Urine Urobilinogen  NORMAL  NORMAL  MG/DL


 


Urine Leukocyte Esterase  2+ H NEGATIVE  


 


Urine RBC (Auto)  NEGATIVE  NEGATIVE  


 


Urine RBC  0-2   /HPF


 


Urine WBC  5-10 H  /HPF


 


Urine Squamous Epithelial


Cells 


  10-25 H


   /HPF


 


 


Urine Crystals  NONE   /LPF


 


Urine Bacteria  TRACE   /HPF


 


Urine Casts  NONE   /LPF


 


Urine Mucus  NEGATIVE   /LPF


 


Urine Culture Indicated  YES   





 (ROSALINO LI MD)


My Orders





Orders - ROSALINO LI MD


Pantoprazole Tablet (Protonix Tablet) (10/18/17 19:30)


Ondansetron Injection (Zofran Injectio (10/18/17 19:30)


Rx-Promethazine Hcl (Rx-Phenergan Supp) (10/18/17 19:18)


Sulfamethoxazole/Trimet Ds Tab (Bactrim (10/18/17 19:30)


 (ROSALINO LI MD)


Medications Given in ED





Current Medications








 Medications  Dose


 Ordered  Sig/Gustavo


 Route  Start Time


 Stop Time Status Last Admin


Dose Admin


 


 Sodium Chloride  1,000 ml @ 


 0 mls/hr  Q0M ONCE


 IV  10/18/17 16:55


 10/18/17 16:57 DC 10/18/17 17:24


0 MLS/HR





 (ROSALINO LI MD)


Vital Signs/I&O





Vital Sign - Last 12Hours








 10/18/17





 16:42


 


Temp 98.1


 


Pulse 72


 


Resp 18


 


B/P (MAP) 135/84


 


Pulse Ox 99


 


O2 Delivery Room Air














Intake and Output 


 


 10/19/17





 00:00


 


Intake Total 1000 ml


 


Balance 1000 ml





 (ROSALINO LI MD)








Blood Pressure Mean:  101








Progress Note :  


Progress Note


Seen and evaluated.  IV, labs, UA, normal saline 1 L bolus and Pepcid 20 mg IV 

ordered.  Monitor patient.  I did review head CT results from last week.


 (LESLEE HART MD)


Progress Note :  


   Time:  19:24


Progress Note


Care of this patient was assumed from Dr. Hart at shift change at 18:00.  

Labs and UA were assessed.  Patient was found to have suggestion of urinary 

tract infection.  I suggested treatment with Bactrim due to low cost.  She 

received a dose of IV Zofran followed by the first dose of Bactrim.  She also 

received oral Protonix.  A take-home packet of promethazine was given for 

backup nausea treatment.  Follow-up instructions were reviewed.  Patient states 

she has a place to stay tonight but does not have a permanent residence.  She 

believes she will be able to afford the Bactrim prescription.


 (ROSALINO LI MD)





Departure


Impression


Impression:  


 Primary Impression:  


 Nausea and vomiting


 Qualified Codes:  R11.2 - Nausea with vomiting, unspecified


 Additional Impression:  


 Urinary tract infection


 Qualified Codes:  N39.0 - Urinary tract infection, site not specified


Disposition:  01 HOME, SELF-CARE


Condition:  Improved





Departure-Patient Inst.


Decision time for Depature:  19:20


 (ROSALINO LI MD)


Referrals:  


[facility] (PCP/Family)


Primary Care Physician


Patient Instructions:  Nausea and Vomiting, Adult, Urinary Tract Infection, 

Adult (DC)





Add. Discharge Instructions:  


Drink plenty of clear liquids.  Complete your antibiotic as prescribed.  Follow-

up at [facility] as soon as possible to review urine culture and for further 

evaluation of your nausea and vomiting.  If nausea and vomiting persist after 

treatment, consider other studies such as testing for H. pylori, gallbladder 

ultrasound, and endoscopy.  Please discuss this with your primary care 

provider.  Return to emergency room if symptoms worsen.  You may continue using 

Zofran (ondansetron) as previously prescribed.  Use the Phenergan (promethazine

) for nausea not controlled by Zofran.











All discharge instructions reviewed with patient and/or family. Voiced 

understanding.


Scripts


Sulfamethoxazole/Trimethoprim (Bactrim Ds Tablet) 1 Each Tablet


1 EACH PO BID, #14 TAB


   Prov: ROSALINO LI MD         10/18/17 


Omeprazole (Omeprazole) 20 Mg Tablet.dr


20 MG PO DAILY, #30 TAB


   Prov: ROSALINO LI MD         10/18/17





Copy


Copies To 1:   SALVADOR LEAHY TIMOTHY D MD Oct 18, 2017 17:16


ROSALINO LI MD Oct 18, 2017 19:25
consumption: 0930                        Time of last solid consumption: 1800                        Date of last liquid consumption: 06/04/25                        Date of last solid food consumption: 06/03/25    BMI:   Wt Readings from Last 3 Encounters:   06/03/25 74.8 kg (165 lb)   05/30/25 70.6 kg (155 lb 10.3 oz)   05/28/25 70.8 kg (156 lb)     Body mass index is 23.68 kg/m².    CBC:   Lab Results   Component Value Date/Time    WBC 3.1 06/04/2025 05:55 AM    RBC 3.57 06/04/2025 05:55 AM    HGB 10.3 06/04/2025 05:55 AM    HCT 31.3 06/04/2025 05:55 AM    MCV 87.7 06/04/2025 05:55 AM    RDW 15.4 06/04/2025 05:55 AM     06/04/2025 05:55 AM       CMP:   Lab Results   Component Value Date/Time     06/04/2025 05:55 AM    K 4.3 06/04/2025 05:55 AM     06/04/2025 05:55 AM    CO2 22 06/04/2025 05:55 AM    BUN 49 06/04/2025 05:55 AM    CREATININE 1.5 06/04/2025 05:55 AM    GFRAA 29 10/23/2015 08:40 AM    LABGLOM 48 06/04/2025 05:55 AM    GLUCOSE 131 06/04/2025 05:55 AM    CALCIUM 8.2 06/04/2025 05:55 AM    BILITOT 0.6 05/30/2025 04:18 PM    ALKPHOS 50 05/30/2025 04:18 PM    AST 41 05/30/2025 04:18 PM    ALT 34 05/30/2025 04:18 PM       POC Tests:   Recent Labs     06/02/25  0809   POCGLU 123*       Coags:   Lab Results   Component Value Date/Time    PROTIME 15.3 07/03/2024 04:54 AM    INR 1.2 07/03/2024 04:54 AM    APTT 29.2 07/03/2024 04:54 AM       HCG (If Applicable): No results found for: \"PREGTESTUR\", \"PREGSERUM\", \"HCG\", \"HCGQUANT\"     ABGs: No results found for: \"PHART\", \"PO2ART\", \"ONB5NYC\", \"RQB1HUE\", \"BEART\", \"C1IBSMFD\"     Type & Screen (If Applicable):  Lab Results   Component Value Date    ABORH O POSITIVE 06/04/2025    LABANTI NEGATIVE 06/04/2025       Drug/Infectious Status (If Applicable):  No results found for: \"HIV\", \"HEPCAB\"    COVID-19 Screening (If Applicable):   Lab Results   Component Value Date/Time    COVID19 DETECTED 11/14/2024 05:35 PM         Cardiac cath 6/2/2025    Minimal